# Patient Record
Sex: FEMALE | Race: WHITE | NOT HISPANIC OR LATINO | URBAN - METROPOLITAN AREA
[De-identification: names, ages, dates, MRNs, and addresses within clinical notes are randomized per-mention and may not be internally consistent; named-entity substitution may affect disease eponyms.]

---

## 2021-07-29 ENCOUNTER — INPATIENT (INPATIENT)
Facility: HOSPITAL | Age: 76
LOS: 3 days | Discharge: HOME | DRG: 85 | End: 2021-08-02
Attending: NEUROLOGICAL SURGERY | Admitting: NEUROLOGICAL SURGERY
Payer: MEDICARE

## 2021-07-29 VITALS
RESPIRATION RATE: 20 BRPM | OXYGEN SATURATION: 98 % | SYSTOLIC BLOOD PRESSURE: 142 MMHG | HEART RATE: 77 BPM | DIASTOLIC BLOOD PRESSURE: 72 MMHG | WEIGHT: 134.92 LBS | TEMPERATURE: 98 F | HEIGHT: 63 IN

## 2021-07-29 LAB
ALBUMIN SERPL ELPH-MCNC: 4.2 G/DL — SIGNIFICANT CHANGE UP (ref 3.3–5)
ALP SERPL-CCNC: 99 U/L — SIGNIFICANT CHANGE UP (ref 40–120)
ALT FLD-CCNC: 16 U/L — SIGNIFICANT CHANGE UP (ref 10–45)
ANION GAP SERPL CALC-SCNC: 14 MMOL/L — SIGNIFICANT CHANGE UP (ref 5–17)
AST SERPL-CCNC: 43 U/L — HIGH (ref 10–40)
BASOPHILS # BLD AUTO: 0.03 K/UL — SIGNIFICANT CHANGE UP (ref 0–0.2)
BASOPHILS NFR BLD AUTO: 0.2 % — SIGNIFICANT CHANGE UP (ref 0–2)
BILIRUB SERPL-MCNC: 0.5 MG/DL — SIGNIFICANT CHANGE UP (ref 0.2–1.2)
BUN SERPL-MCNC: 16 MG/DL — SIGNIFICANT CHANGE UP (ref 7–23)
CALCIUM SERPL-MCNC: 9.4 MG/DL — SIGNIFICANT CHANGE UP (ref 8.4–10.5)
CHLORIDE SERPL-SCNC: 90 MMOL/L — LOW (ref 96–108)
CO2 SERPL-SCNC: 22 MMOL/L — SIGNIFICANT CHANGE UP (ref 22–31)
CREAT SERPL-MCNC: 0.64 MG/DL — SIGNIFICANT CHANGE UP (ref 0.5–1.3)
EOSINOPHIL # BLD AUTO: 0.01 K/UL — SIGNIFICANT CHANGE UP (ref 0–0.5)
EOSINOPHIL NFR BLD AUTO: 0.1 % — SIGNIFICANT CHANGE UP (ref 0–6)
GLUCOSE SERPL-MCNC: 184 MG/DL — HIGH (ref 70–99)
HCT VFR BLD CALC: 38.1 % — SIGNIFICANT CHANGE UP (ref 34.5–45)
HGB BLD-MCNC: 12.6 G/DL — SIGNIFICANT CHANGE UP (ref 11.5–15.5)
IMM GRANULOCYTES NFR BLD AUTO: 0.6 % — SIGNIFICANT CHANGE UP (ref 0–1.5)
LIDOCAIN IGE QN: 31 U/L — SIGNIFICANT CHANGE UP (ref 7–60)
LYMPHOCYTES # BLD AUTO: 0.96 K/UL — LOW (ref 1–3.3)
LYMPHOCYTES # BLD AUTO: 6.2 % — LOW (ref 13–44)
MCHC RBC-ENTMCNC: 29 PG — SIGNIFICANT CHANGE UP (ref 27–34)
MCHC RBC-ENTMCNC: 33.1 GM/DL — SIGNIFICANT CHANGE UP (ref 32–36)
MCV RBC AUTO: 87.8 FL — SIGNIFICANT CHANGE UP (ref 80–100)
MONOCYTES # BLD AUTO: 0.85 K/UL — SIGNIFICANT CHANGE UP (ref 0–0.9)
MONOCYTES NFR BLD AUTO: 5.5 % — SIGNIFICANT CHANGE UP (ref 2–14)
NEUTROPHILS # BLD AUTO: 13.6 K/UL — HIGH (ref 1.8–7.4)
NEUTROPHILS NFR BLD AUTO: 87.4 % — HIGH (ref 43–77)
NRBC # BLD: 0 /100 WBCS — SIGNIFICANT CHANGE UP (ref 0–0)
PLATELET # BLD AUTO: 252 K/UL — SIGNIFICANT CHANGE UP (ref 150–400)
POTASSIUM SERPL-MCNC: 4.1 MMOL/L — SIGNIFICANT CHANGE UP (ref 3.5–5.3)
POTASSIUM SERPL-SCNC: 4.1 MMOL/L — SIGNIFICANT CHANGE UP (ref 3.5–5.3)
PROT SERPL-MCNC: 6.9 G/DL — SIGNIFICANT CHANGE UP (ref 6–8.3)
RBC # BLD: 4.34 M/UL — SIGNIFICANT CHANGE UP (ref 3.8–5.2)
RBC # FLD: 12.3 % — SIGNIFICANT CHANGE UP (ref 10.3–14.5)
SODIUM SERPL-SCNC: 126 MMOL/L — LOW (ref 135–145)
TROPONIN T, HIGH SENSITIVITY RESULT: 10 NG/L — SIGNIFICANT CHANGE UP (ref 0–51)
WBC # BLD: 15.54 K/UL — HIGH (ref 3.8–10.5)
WBC # FLD AUTO: 15.54 K/UL — HIGH (ref 3.8–10.5)

## 2021-07-29 PROCEDURE — 71045 X-RAY EXAM CHEST 1 VIEW: CPT | Mod: 26

## 2021-07-29 PROCEDURE — 93010 ELECTROCARDIOGRAM REPORT: CPT

## 2021-07-29 PROCEDURE — 72125 CT NECK SPINE W/O DYE: CPT | Mod: 26,MH

## 2021-07-29 PROCEDURE — 99291 CRITICAL CARE FIRST HOUR: CPT

## 2021-07-29 PROCEDURE — 70450 CT HEAD/BRAIN W/O DYE: CPT | Mod: 26,MH

## 2021-07-29 RX ORDER — ONDANSETRON 8 MG/1
4 TABLET, FILM COATED ORAL ONCE
Refills: 0 | Status: COMPLETED | OUTPATIENT
Start: 2021-07-29 | End: 2021-07-29

## 2021-07-29 RX ADMIN — ONDANSETRON 4 MILLIGRAM(S): 8 TABLET, FILM COATED ORAL at 23:15

## 2021-07-29 NOTE — ED PROVIDER NOTE - PHYSICAL EXAMINATION
GENERAL: non-toxic appearing, in NAD  HEAD: periorbital ecchymosis of L eye.  EYES: vision grossly intact, no conjunctivitis or discharge  EARS: hearing grossly intact  NOSE: no nasal discharge, dried blood in b/l nares, no septal hematoma appreciated  CARDIAC: RRR, normal S1S2,  no appreciable murmurs, no cyanosis, cap refill < 2 seconds  PULM: no respiratory distress, oxygen saturation on RA wnl, CTAB, no crackles, rales, rhonchi, or wheezing  GI: abdomen nondistended, soft, nontender, no guarding or rebound tenderness, no palpable masses  NEURO: Awake, alert, confused as to recent events. Sensation intact throughout, coordination intact. Moving all extremities freely.  MSK: spine appears normal, no joint swelling or erythema, no gross deformities of extremities  EXT: no peripheral edema, calf tenderness, redness or swelling  SKIN: warm, dry, and intact, no rashes  PSYCH: appropriate mood and affect

## 2021-07-29 NOTE — ED PROVIDER NOTE - SECONDARY DIAGNOSIS.
Closed fracture of right side of occipital bone, unspecified occipital fracture type, initial encounter

## 2021-07-29 NOTE — ED PROVIDER NOTE - ATTENDING CONTRIBUTION TO CARE
76 F w/ HTN, HLD, multiple falls bruising on the L eyelid and L knee bruising. emesis at home   Plan for labs ct scan and reassessment 76 F w/ HTN, HLD, multiple falls in the past 24 hours w/ last fall at approx 6 PM when  arrived home from work and he found his wife on the ground, he helped her up and she was able to ambulate to the bathroom when he fell again pt then had multiple falls and reports that she had dark emesis,  thinks she ate a plum today. Pt then went to sleep in bed but this evening  became concerned that the bruising on the L eyelid and L knee bruising was worsening and his wife was more confused. He reports his wife is usually active and can walk 5 miles a day. He states that she isn't acting normal. Pt w/ headache, +amnesia, nausea  on exam, pt w/ 5/5 upper and lower extremity strength, oriented to person and place thinks it is 1922, she is able to identify her  she cannot describe what happened today, she is asking to go home, she has no faical droop, she has clear lungs, no c/t/l spine tenderness, no pain when palpating the extremities, has mild L knee bruising but no tenderness, has a pressure wound on the elbow on the R, CT expedited Plan for labs ct scan and reassessment  pt found to have a head bleed, reviewed by ER staff- nsg consulted  updated on plan of care, and admission to hospital. pt not on ASA, no AC/AP

## 2021-07-29 NOTE — ED PROVIDER NOTE - CARE PLAN
Principal Discharge DX:	Intracranial hemorrhage  Secondary Diagnosis:	Closed fracture of right side of occipital bone, unspecified occipital fracture type, initial encounter

## 2021-07-30 DIAGNOSIS — I62.9 NONTRAUMATIC INTRACRANIAL HEMORRHAGE, UNSPECIFIED: ICD-10-CM

## 2021-07-30 DIAGNOSIS — E87.1 HYPO-OSMOLALITY AND HYPONATREMIA: ICD-10-CM

## 2021-07-30 DIAGNOSIS — S02.119A UNSPECIFIED FRACTURE OF OCCIPUT, INITIAL ENCOUNTER FOR CLOSED FRACTURE: ICD-10-CM

## 2021-07-30 DIAGNOSIS — R55 SYNCOPE AND COLLAPSE: ICD-10-CM

## 2021-07-30 LAB
ANION GAP SERPL CALC-SCNC: 12 MMOL/L — SIGNIFICANT CHANGE UP (ref 5–17)
ANION GAP SERPL CALC-SCNC: 12 MMOL/L — SIGNIFICANT CHANGE UP (ref 5–17)
ANION GAP SERPL CALC-SCNC: 14 MMOL/L — SIGNIFICANT CHANGE UP (ref 5–17)
APPEARANCE UR: ABNORMAL
APTT BLD: 27.2 SEC — LOW (ref 27.5–35.5)
BACTERIA # UR AUTO: ABNORMAL
BASE EXCESS BLDV CALC-SCNC: 3.9 MMOL/L — HIGH (ref -2–2)
BILIRUB UR-MCNC: NEGATIVE — SIGNIFICANT CHANGE UP
BUN SERPL-MCNC: 12 MG/DL — SIGNIFICANT CHANGE UP (ref 7–23)
BUN SERPL-MCNC: 12 MG/DL — SIGNIFICANT CHANGE UP (ref 7–23)
BUN SERPL-MCNC: 14 MG/DL — SIGNIFICANT CHANGE UP (ref 7–23)
CA-I SERPL-SCNC: 1.05 MMOL/L — LOW (ref 1.12–1.3)
CALCIUM SERPL-MCNC: 8.9 MG/DL — SIGNIFICANT CHANGE UP (ref 8.4–10.5)
CALCIUM SERPL-MCNC: 9 MG/DL — SIGNIFICANT CHANGE UP (ref 8.4–10.5)
CALCIUM SERPL-MCNC: 9.2 MG/DL — SIGNIFICANT CHANGE UP (ref 8.4–10.5)
CHLORIDE BLDV-SCNC: 93 MMOL/L — LOW (ref 96–108)
CHLORIDE SERPL-SCNC: 90 MMOL/L — LOW (ref 96–108)
CHLORIDE SERPL-SCNC: 93 MMOL/L — LOW (ref 96–108)
CHLORIDE SERPL-SCNC: 94 MMOL/L — LOW (ref 96–108)
CHLORIDE UR-SCNC: 74 MMOL/L — SIGNIFICANT CHANGE UP
CO2 BLDV-SCNC: 30 MMOL/L — SIGNIFICANT CHANGE UP (ref 22–30)
CO2 SERPL-SCNC: 22 MMOL/L — SIGNIFICANT CHANGE UP (ref 22–31)
CO2 SERPL-SCNC: 24 MMOL/L — SIGNIFICANT CHANGE UP (ref 22–31)
CO2 SERPL-SCNC: 26 MMOL/L — SIGNIFICANT CHANGE UP (ref 22–31)
COLOR SPEC: SIGNIFICANT CHANGE UP
CREAT ?TM UR-MCNC: 51 MG/DL — SIGNIFICANT CHANGE UP
CREAT ?TM UR-MCNC: 81 MG/DL — SIGNIFICANT CHANGE UP
CREAT SERPL-MCNC: 0.62 MG/DL — SIGNIFICANT CHANGE UP (ref 0.5–1.3)
CREAT SERPL-MCNC: 0.68 MG/DL — SIGNIFICANT CHANGE UP (ref 0.5–1.3)
CREAT SERPL-MCNC: 0.68 MG/DL — SIGNIFICANT CHANGE UP (ref 0.5–1.3)
DIFF PNL FLD: NEGATIVE — SIGNIFICANT CHANGE UP
GAS PNL BLDV: 126 MMOL/L — LOW (ref 135–145)
GAS PNL BLDV: SIGNIFICANT CHANGE UP
GAS PNL BLDV: SIGNIFICANT CHANGE UP
GLUCOSE BLDV-MCNC: 163 MG/DL — HIGH (ref 70–99)
GLUCOSE SERPL-MCNC: 120 MG/DL — HIGH (ref 70–99)
GLUCOSE SERPL-MCNC: 122 MG/DL — HIGH (ref 70–99)
GLUCOSE SERPL-MCNC: 142 MG/DL — HIGH (ref 70–99)
GLUCOSE UR QL: NEGATIVE — SIGNIFICANT CHANGE UP
HCO3 BLDV-SCNC: 28 MMOL/L — SIGNIFICANT CHANGE UP (ref 21–29)
HCT VFR BLDA CALC: 39 % — SIGNIFICANT CHANGE UP (ref 39–50)
HGB BLD CALC-MCNC: 12.6 G/DL — SIGNIFICANT CHANGE UP (ref 11.5–15.5)
INR BLD: 0.96 RATIO — SIGNIFICANT CHANGE UP (ref 0.88–1.16)
KETONES UR-MCNC: NEGATIVE — SIGNIFICANT CHANGE UP
LACTATE BLDV-MCNC: 2 MMOL/L — SIGNIFICANT CHANGE UP (ref 0.7–2)
LEUKOCYTE ESTERASE UR-ACNC: ABNORMAL
NITRITE UR-MCNC: NEGATIVE — SIGNIFICANT CHANGE UP
OSMOLALITY SERPL: 264 MOSMOL/KG — LOW (ref 280–301)
OSMOLALITY UR: 518 MOS/KG — SIGNIFICANT CHANGE UP (ref 300–900)
OSMOLALITY UR: 544 MOS/KG — SIGNIFICANT CHANGE UP (ref 300–900)
PCO2 BLDV: 43 MMHG — SIGNIFICANT CHANGE UP (ref 35–50)
PH BLDV: 7.43 — SIGNIFICANT CHANGE UP (ref 7.35–7.45)
PH UR: 6.5 — SIGNIFICANT CHANGE UP (ref 5–8)
PO2 BLDV: 35 MMHG — SIGNIFICANT CHANGE UP (ref 25–45)
POTASSIUM BLDV-SCNC: 2.8 MMOL/L — CRITICAL LOW (ref 3.5–5.3)
POTASSIUM SERPL-MCNC: 3.3 MMOL/L — LOW (ref 3.5–5.3)
POTASSIUM SERPL-MCNC: 3.4 MMOL/L — LOW (ref 3.5–5.3)
POTASSIUM SERPL-MCNC: 3.5 MMOL/L — SIGNIFICANT CHANGE UP (ref 3.5–5.3)
POTASSIUM SERPL-SCNC: 3.3 MMOL/L — LOW (ref 3.5–5.3)
POTASSIUM SERPL-SCNC: 3.4 MMOL/L — LOW (ref 3.5–5.3)
POTASSIUM SERPL-SCNC: 3.5 MMOL/L — SIGNIFICANT CHANGE UP (ref 3.5–5.3)
POTASSIUM UR-SCNC: 70 MMOL/L — SIGNIFICANT CHANGE UP
PROT UR-MCNC: NEGATIVE — SIGNIFICANT CHANGE UP
PROTHROM AB SERPL-ACNC: 11.5 SEC — SIGNIFICANT CHANGE UP (ref 10.6–13.6)
RBC CASTS # UR COMP ASSIST: 5 /HPF — HIGH (ref 0–4)
SAO2 % BLDV: 68 % — SIGNIFICANT CHANGE UP (ref 67–88)
SARS-COV-2 RNA SPEC QL NAA+PROBE: SIGNIFICANT CHANGE UP
SODIUM SERPL-SCNC: 126 MMOL/L — LOW (ref 135–145)
SODIUM SERPL-SCNC: 129 MMOL/L — LOW (ref 135–145)
SODIUM SERPL-SCNC: 132 MMOL/L — LOW (ref 135–145)
SODIUM UR-SCNC: 69 MMOL/L — SIGNIFICANT CHANGE UP
SP GR SPEC: 1.02 — SIGNIFICANT CHANGE UP (ref 1.01–1.02)
TROPONIN T, HIGH SENSITIVITY RESULT: 13 NG/L — SIGNIFICANT CHANGE UP (ref 0–51)
UROBILINOGEN FLD QL: NEGATIVE — SIGNIFICANT CHANGE UP

## 2021-07-30 PROCEDURE — 93010 ELECTROCARDIOGRAM REPORT: CPT

## 2021-07-30 PROCEDURE — 70450 CT HEAD/BRAIN W/O DYE: CPT | Mod: 26,MH

## 2021-07-30 PROCEDURE — 71250 CT THORAX DX C-: CPT | Mod: 26

## 2021-07-30 PROCEDURE — 73560 X-RAY EXAM OF KNEE 1 OR 2: CPT | Mod: 26,LT

## 2021-07-30 PROCEDURE — 93306 TTE W/DOPPLER COMPLETE: CPT | Mod: 26

## 2021-07-30 PROCEDURE — 70496 CT ANGIOGRAPHY HEAD: CPT | Mod: 26

## 2021-07-30 PROCEDURE — 99233 SBSQ HOSP IP/OBS HIGH 50: CPT

## 2021-07-30 PROCEDURE — 74176 CT ABD & PELVIS W/O CONTRAST: CPT | Mod: 26

## 2021-07-30 PROCEDURE — 76376 3D RENDER W/INTRP POSTPROCES: CPT | Mod: 26

## 2021-07-30 PROCEDURE — 99222 1ST HOSP IP/OBS MODERATE 55: CPT | Mod: GC

## 2021-07-30 RX ORDER — FAMOTIDINE 10 MG/ML
20 INJECTION INTRAVENOUS
Refills: 0 | Status: DISCONTINUED | OUTPATIENT
Start: 2021-07-30 | End: 2021-08-02

## 2021-07-30 RX ORDER — MECLIZINE HCL 12.5 MG
1 TABLET ORAL
Qty: 0 | Refills: 0 | DISCHARGE

## 2021-07-30 RX ORDER — ONDANSETRON 8 MG/1
4 TABLET, FILM COATED ORAL EVERY 6 HOURS
Refills: 0 | Status: DISCONTINUED | OUTPATIENT
Start: 2021-07-30 | End: 2021-08-02

## 2021-07-30 RX ORDER — MONTELUKAST 4 MG/1
1 TABLET, CHEWABLE ORAL
Qty: 0 | Refills: 0 | DISCHARGE

## 2021-07-30 RX ORDER — PANTOPRAZOLE SODIUM 20 MG/1
80 TABLET, DELAYED RELEASE ORAL ONCE
Refills: 0 | Status: COMPLETED | OUTPATIENT
Start: 2021-07-30 | End: 2021-07-30

## 2021-07-30 RX ORDER — INSULIN LISPRO 100/ML
VIAL (ML) SUBCUTANEOUS
Refills: 0 | Status: DISCONTINUED | OUTPATIENT
Start: 2021-07-30 | End: 2021-08-02

## 2021-07-30 RX ORDER — ATORVASTATIN CALCIUM 80 MG/1
40 TABLET, FILM COATED ORAL ONCE
Refills: 0 | Status: DISCONTINUED | OUTPATIENT
Start: 2021-07-30 | End: 2021-07-30

## 2021-07-30 RX ORDER — LEVETIRACETAM 250 MG/1
500 TABLET, FILM COATED ORAL
Refills: 0 | Status: DISCONTINUED | OUTPATIENT
Start: 2021-07-30 | End: 2021-08-02

## 2021-07-30 RX ORDER — FLUTICASONE PROPIONATE 50 MCG
1 SPRAY, SUSPENSION NASAL
Qty: 0 | Refills: 0 | DISCHARGE

## 2021-07-30 RX ORDER — SODIUM CHLORIDE 5 G/100ML
1000 INJECTION, SOLUTION INTRAVENOUS
Refills: 0 | Status: DISCONTINUED | OUTPATIENT
Start: 2021-07-30 | End: 2021-07-30

## 2021-07-30 RX ORDER — ALBUTEROL 90 UG/1
2 AEROSOL, METERED ORAL
Qty: 0 | Refills: 0 | DISCHARGE

## 2021-07-30 RX ORDER — FLUTICASONE PROPIONATE 50 MCG
1 SPRAY, SUSPENSION NASAL
Refills: 0 | Status: DISCONTINUED | OUTPATIENT
Start: 2021-07-30 | End: 2021-08-02

## 2021-07-30 RX ORDER — ALPRAZOLAM 0.25 MG
0.25 TABLET ORAL THREE TIMES A DAY
Refills: 0 | Status: DISCONTINUED | OUTPATIENT
Start: 2021-07-30 | End: 2021-08-02

## 2021-07-30 RX ORDER — PANTOPRAZOLE SODIUM 20 MG/1
40 TABLET, DELAYED RELEASE ORAL
Refills: 0 | Status: DISCONTINUED | OUTPATIENT
Start: 2021-07-30 | End: 2021-08-02

## 2021-07-30 RX ORDER — ATENOLOL 25 MG/1
25 TABLET ORAL DAILY
Refills: 0 | Status: DISCONTINUED | OUTPATIENT
Start: 2021-07-30 | End: 2021-08-02

## 2021-07-30 RX ORDER — SENNA PLUS 8.6 MG/1
2 TABLET ORAL AT BEDTIME
Refills: 0 | Status: DISCONTINUED | OUTPATIENT
Start: 2021-07-30 | End: 2021-07-31

## 2021-07-30 RX ORDER — ATENOLOL 25 MG/1
1 TABLET ORAL
Qty: 0 | Refills: 0 | DISCHARGE

## 2021-07-30 RX ORDER — ATORVASTATIN CALCIUM 80 MG/1
40 TABLET, FILM COATED ORAL ONCE
Refills: 0 | Status: DISCONTINUED | OUTPATIENT
Start: 2021-07-30 | End: 2021-08-02

## 2021-07-30 RX ORDER — FAMOTIDINE 10 MG/ML
1 INJECTION INTRAVENOUS
Qty: 0 | Refills: 0 | DISCHARGE

## 2021-07-30 RX ORDER — INSULIN LISPRO 100/ML
VIAL (ML) SUBCUTANEOUS AT BEDTIME
Refills: 0 | Status: DISCONTINUED | OUTPATIENT
Start: 2021-07-30 | End: 2021-08-02

## 2021-07-30 RX ORDER — ALPRAZOLAM 0.25 MG
1 TABLET ORAL
Qty: 0 | Refills: 0 | DISCHARGE

## 2021-07-30 RX ORDER — ATORVASTATIN CALCIUM 80 MG/1
1 TABLET, FILM COATED ORAL
Qty: 0 | Refills: 0 | DISCHARGE

## 2021-07-30 RX ORDER — POTASSIUM CHLORIDE 20 MEQ
20 PACKET (EA) ORAL
Refills: 0 | Status: COMPLETED | OUTPATIENT
Start: 2021-07-30 | End: 2021-07-31

## 2021-07-30 RX ORDER — ALBUTEROL 90 UG/1
1 AEROSOL, METERED ORAL EVERY 6 HOURS
Refills: 0 | Status: DISCONTINUED | OUTPATIENT
Start: 2021-07-30 | End: 2021-08-02

## 2021-07-30 RX ORDER — MONTELUKAST 4 MG/1
10 TABLET, CHEWABLE ORAL DAILY
Refills: 0 | Status: DISCONTINUED | OUTPATIENT
Start: 2021-07-30 | End: 2021-08-02

## 2021-07-30 RX ORDER — ACETAMINOPHEN 500 MG
650 TABLET ORAL EVERY 6 HOURS
Refills: 0 | Status: DISCONTINUED | OUTPATIENT
Start: 2021-07-30 | End: 2021-08-02

## 2021-07-30 RX ORDER — ACETAMINOPHEN 500 MG
1000 TABLET ORAL ONCE
Refills: 0 | Status: COMPLETED | OUTPATIENT
Start: 2021-07-30 | End: 2021-07-30

## 2021-07-30 RX ORDER — FLUTICASONE PROPIONATE 50 MCG
1 SPRAY, SUSPENSION NASAL EVERY 24 HOURS
Refills: 0 | Status: DISCONTINUED | OUTPATIENT
Start: 2021-07-30 | End: 2021-07-30

## 2021-07-30 RX ORDER — SODIUM CHLORIDE 5 G/100ML
1000 INJECTION, SOLUTION INTRAVENOUS
Refills: 0 | Status: DISCONTINUED | OUTPATIENT
Start: 2021-07-30 | End: 2021-07-31

## 2021-07-30 RX ORDER — MECLIZINE HCL 12.5 MG
12.5 TABLET ORAL ONCE
Refills: 0 | Status: DISCONTINUED | OUTPATIENT
Start: 2021-07-30 | End: 2021-08-02

## 2021-07-30 RX ORDER — POTASSIUM CHLORIDE 20 MEQ
40 PACKET (EA) ORAL EVERY 4 HOURS
Refills: 0 | Status: COMPLETED | OUTPATIENT
Start: 2021-07-30 | End: 2021-07-30

## 2021-07-30 RX ORDER — DEXLANSOPRAZOLE 30 MG/1
1 CAPSULE, DELAYED RELEASE ORAL
Qty: 0 | Refills: 0 | DISCHARGE

## 2021-07-30 RX ADMIN — Medication 650 MILLIGRAM(S): at 09:30

## 2021-07-30 RX ADMIN — SODIUM CHLORIDE 30 MILLILITER(S): 5 INJECTION, SOLUTION INTRAVENOUS at 02:37

## 2021-07-30 RX ADMIN — PANTOPRAZOLE SODIUM 40 MILLIGRAM(S): 20 TABLET, DELAYED RELEASE ORAL at 08:42

## 2021-07-30 RX ADMIN — LEVETIRACETAM 500 MILLIGRAM(S): 250 TABLET, FILM COATED ORAL at 01:15

## 2021-07-30 RX ADMIN — Medication 400 MILLIGRAM(S): at 02:06

## 2021-07-30 RX ADMIN — Medication 50 MILLIEQUIVALENT(S): at 22:20

## 2021-07-30 RX ADMIN — PANTOPRAZOLE SODIUM 80 MILLIGRAM(S): 20 TABLET, DELAYED RELEASE ORAL at 02:06

## 2021-07-30 RX ADMIN — Medication 650 MILLIGRAM(S): at 17:00

## 2021-07-30 RX ADMIN — Medication 650 MILLIGRAM(S): at 08:48

## 2021-07-30 RX ADMIN — ATENOLOL 25 MILLIGRAM(S): 25 TABLET ORAL at 08:42

## 2021-07-30 RX ADMIN — Medication 40 MILLIEQUIVALENT(S): at 19:01

## 2021-07-30 RX ADMIN — Medication 650 MILLIGRAM(S): at 16:04

## 2021-07-30 RX ADMIN — LEVETIRACETAM 500 MILLIGRAM(S): 250 TABLET, FILM COATED ORAL at 19:00

## 2021-07-30 NOTE — ED ADULT NURSE NOTE - NSFALLRSKOUTCOME_ED_ALL_ED
Head,  normocephalic,  atraumatic,  Face,  Face within normal limits,  Ears,  External ears within normal limits,  Nose/Nasopharynx,  External nose  normal appearance,  nares patent,  no nasal discharge,  Mouth and Throat,  Oral cavity appearance normal,  Breath odor normal,  Lips,  Appearance normal
Fall with Harm Risk

## 2021-07-30 NOTE — ED ADULT NURSE NOTE - OBJECTIVE STATEMENT
77y/o F coming to the ED s/p fall.  states that around 6pm he came home from work and found the pt on the floor, was able to get her up and she fell a few more times a/w multiple episodes of vomiting.  states that she normally walks 5miles/day and is A&Ox4. On exam PT A&Ox2, unable to state where & what happened & does not remember fall, neg facial drop, neg slurred speech, PERRL 3mm with hematoma to L eye, denies blurred vision, equal strength and sensations in all extremities. Denies numbness, tingling, or weakness & able to follow commands. Heart monitor placed, NSR. Abdomen soft, nontender, nondistended. Abrasions noted to B/L elbows & knee. IV placed, Labs collected and sent. Pt given call bell and educated on how to use. 77y/o F coming to the ED s/p fall.  states that around 6pm he came home from work and found the pt on the floor, was able to get her up and she fell a few more times a/w multiple episodes of vomiting.  states that she normally walks 5miles/day and is A&Ox4. On exam PT A&Ox2, unable to state where & what happened & does not remember fall, neg facial drop, neg slurred speech, PERRL 3mm with hematoma to L eye, denies blurred vision, equal strength and sensations in all extremities. Denies numbness, tingling, or weakness & able to follow commands. Heart monitor placed, NSR. Abdomen soft, nontender, nondistended. Abrasions noted to B/L elbows & knee. Pt states feels safe @ home & denies any abuse. IV placed, Labs collected and sent. Pt given call bell and educated on how to use.

## 2021-07-30 NOTE — CONSULT NOTE ADULT - SUBJECTIVE AND OBJECTIVE BOX
Past Medical History:     Reviewed history from 12/09/2019 and no changes required:        Hypertension        Hyperlipidemia        [-] Diabetes        Chronic hiatus hernia, questionably since childhood.        Chronic insufficiency        Mild asthma        Knee OA, mild        Increased ferritin, negative hematology workup.        8/19 holter sinus rhythm        9/19 Stress echo borderline EKG changes, nrml echo, nrml stress        anticipated left eye cataract  12/17/19 dr Cleary    Past Surgical History:     Reviewed history from 08/19/2019 and no changes required:        Lipoma resection rit arm 2017.        Previous D&C.        Strabismus eye surgery, age 1                    Family History Summary:      Reviewed history Last on 02/03/2021 and no changes required:05/19/2021      General Comments - FH:  Mother -- Dec'd 81, dementia.  Father -- Dec'd 64, COPD, MI.  Sister -- 65, good health.  Son -- 51, colon cancer, doing well.  Daughter -- 48, good health.    Social History:     Reviewed history from 08/19/2019 and no changes required:        .        Nonsmoker.        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       Past Medical History:     Reviewed history from 12/09/2019 and no changes required:        Hypertension        Hyperlipidemia        [-] Diabetes        Chronic hiatus hernia, questionably since childhood.        Chronic insufficiency        Mild asthma        Knee OA, mild        Increased ferritin, negative hematology workup.        8/19 holter sinus rhythm        9/19 Stress echo borderline EKG changes, nrml echo, nrml stress        anticipated left eye cataract  12/17/19 dr Cleary    Past Surgical History:     Reviewed history from 08/19/2019 and no changes required:        Lipoma resection [facility] arm 2017.        Previous D&C.        Strabismus eye surgery, age 1          Family History Summary:      Reviewed history Last on 02/03/2021 and no changes required:05/19/2021      General Comments - FH:  Mother -- Dec'd 81, dementia.  Father -- Dec'd 64, COPD, MI.  Sister -- 65, good health.  Son -- 51, colon cancer, doing well.  Daughter -- 48, good health.    Social History:     Reviewed history from 08/19/2019 and no changes required:        .        Nonsmoker.        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            Transthoracic Echocardiography Report (TTE)     Patient Name LESLYE ARGUELLO  Accession #          [id number]                B      MRN #        [id number]            Date of Study        05/14/2021      Date of      1945        Referring Physician  Goldberg,Steven MD   Birth      Age          75 year(s)        Sonographer          Marcin Corral, RVS      Gender       Female            Interpreting         Jimmy Moya MD                                  Physician     Height: 63 inches Weight: 128 pounds BSA: 1.6 m^2 BMI: 22.67 kg/m^2    M-Mode/2D Measurements DERIVED VARIABLES      LVID Diastole: 4.9 cm                                    FS34.69 %   LVID Systole: 3.2 cm                                     EF [id number] %   LV Septum: 0.8 cm   LV PW: 0.8 cm   LV EDV/LV EDV Index: 69.9 ml/ 44 ml/m^2   LV ESV/LV ESV Index: 23.1 ml/ 14 ml/m^2      Aortic Root Dimension: 2.8 cm   Ascending Aorta:3.3 cm      LA Dimension: 3.4 cm   LA volume/Index: 26.2 ml /16 m^2     Aortic Valve Mitral Valve      AV Peak Velocity: 1.21 m/s          MV Peak E-Wave: 0.71 m/s   AV Peak Gradient: 5.86 mmHg         MV Peak A-Wave: 1.07 m/s   AV Mean Gradient: 3 mmHg            MV E/A Ratio: 0.67   LVOT Peak Velocity: 1.05 m/s        MV P1/2t: 61 msec   LVOT: 2.2 cm                        MV Mean Gradient: 2 mmHg   AV Area (Continuity):3.22 cm^2      MV Area (continuity): 2.11 cm^2                                       MV Deceleration Time: 208 msec                                       MV Area (PHT): 3.61 cm^2   Tricuspid Valve                     E' Septal Velocity: 0.05 m/s   TR Velocity:2.02 m/s                E' Lateral Velocity: 0.07 m/s   TR Gradient:16.32 mmHg                                          Pulmonic Valve                                       PV Peak Velocity: 0.88 m/s                                       PV Peak Gradient: 3.06 mmHg                                       PV Mean Gradient:2 mmHg      Findings      Mitral Valve   Normal mitral valve structure and function.   No evidence of mitral valve stenosis.   No evidence of mitral regurgitation.      Aortic Valve   Calcified, thickened, tricuspid valve.   Mild aortic regurgitation.   No aortic stenosis.      Tricuspid Valve   Normal tricuspid valve structure and function.   Normal pulmonary artery pressure. Estimated PASP 24 mmHg.      Pulmonic Valve   The pulmonic valve was not well visualized .      Left Atrium   Normal left atrium.      Left Ventricle   Normal left ventricle size, thickness and systolic function.   LVEF 60-65 %   E/A flow reversal noted. Suggestive of diastolic dysfunction.      Right Atrium   Normal right atrium.      Right Ventricle   Normal right ventricle structure and function.      Pericardial Effusion   No evidence of any pericardial effusion.      Miscellaneous   Dilation of ascending aorta 3.3 cm, indexed to BSA 2cm/m2.      Summary   Normal left ventricle size, thickness and systolic function.   LVEF 60-65 %   Reduced LV compliance.   Normal LA and right cavities size.   Aortic valve sclerosis without significant stenosis.   Mild aortic regurgitation.   Normal pulmonary artery pressure.   No evidence of pericardial effusion.   Dilation of ascending aorta 3.3 cm, indexed to BSA 2cm/m2.      Signature      ----------------------------------------------------------------   Electronically signed by Jimmy Moya MD (Interpreting   physician) on 05/14/2021 at 10:23 AM                                                                                                                                               Status:Open                                                        Carotid Duplex Study     Demographics                                                        Procedure Staff   Patient name: LESLYE WEAVER                                    Ordering physician:           [facility] CARD[facility]OLOG[facility]   Patient ID:   [id number]                                                                       Referring Physician:          Goldberg,Steven MD   Gender:            Female                                           Interpreting physician:       Jimmy Moya MD   YOB: 1945                                        Sonographer:                  Marcin Corral S      Procedure Information  Procedure date: 5/14/2021 7:18 AM                                   Accession no:        [id number]    Carotid Procedure Findings                                Right                                             Left   Location                   PSV (cm/s)   EDV (cm/s)        Stenosis %         PSV (cm/s)             EDV (cm/s)     Stenosis %   Prox CCA                   83.7         15.7              0%                 74.2                   20.8           0%   Mid CCA                    80.3         23.5              0%                 71.3                   21.4           0%   Dist CCA                   68.1         18.3              0%                 82.6                   23.8           0%   Bulb                       60.1         15.5              0%                 58.2                   11.3           0%   Prox ICA                   77.1         19.9              0%                 73.6                   23.2           0%   Mid ICA                    89.2         25.7              0%                 107                    39.2           16-49%   Dist ICA                   51.9         18.4              0%                 48                     14.5           0%   Prox ECA                   56.7         11.5              0%                 77.2                   20.2           0%   Vertebral                  74           18.9              0%                 58.8                   13.3           0%   Mid Subclavian             81.3                           0%                 85.3                                  0%  Right ICA/CCA ratio: 1.31                                                         Left ICA/CCA ratio: 1.3  Right vertebral flow:                                               Left vertebral flow:  Antegrade                                                           Antegrade      Findings:  Color Doppler imaging of the bilateral common carotid, bulb, internal and external carotids showed mild to moderate  heterogenous plaque in left internal carotid artery. No evidence of hemodynamically significant stenosis bilaterally. Vertebrals  were antegrade bilaterally.          Patient name: LESLYE WEAVER                                                      5/14/2021 7:18 AM                                                                      Date of                                                                      study:  Encounter#: [id number]                                                                                                                                 Page 1/2  Accession#: [id number]                                                [facility]                                                     [address] [place] [address]                                                                    Tel: [phone]                                                                    Fax: [phone]      Physician Conclusions  Summary:  1. MILD TO MODERATE LEFT INTERNAL CAROTID ARTERY STENOSIS 16-49%.    Electronically signed by Jimmy Moya MD (Interpreting physician) on 5/14/2021 at 10:27 AM       This is a pleasant 76-year-old female with a past medical history of hypertension, hyperlipidemia, mild intermittent asthma, osteoarthritis of the knees, who presented to the [facility] emergency room during the late evening hours of July 30th, 2021, after having experienced several episodes of syncope at home earlier in the day, which eventually resulted in a fall.  The patient, when questioned about the timing, duration, associated symptoms, and surrounding context of her syncopal episodes + fall, cannot precisely recall the exact details surrounding her syncopal episodes, nor does she remember the actual fall; she does recalls being assisted by her , who apparently found her on the ground when he came home, to the restroom where she had several episodes of vomiting "dark blood".              Past Medical History:     Reviewed history from 12/09/2019 and no changes required:        Hypertension        Hyperlipidemia        [-] Diabetes        Chronic hiatus hernia, questionably since childhood.        Chronic venous insufficiency        Mild intermittent asthma        Knee OA, mild        Increased ferritin, negative hematology workup.        8/19 holter sinus rhythm        9/19 Stress echo borderline EKG changes, nrml echo, nrml stress        anticipated left eye cataract  12/17/19 dr Cleary    Past Surgical History:     Reviewed history from 08/19/2019 and no changes required:        Lipoma resection rit arm 2017.        Previous D&C.        Strabismus eye surgery, age 1          Family History Summary:      Reviewed history Last on 02/03/2021 and no changes required:05/19/2021      General Comments - FH:  Mother -- Dec'd 81, dementia.  Father -- Dec'd 64, COPD, MI.  Sister -- 65, good health.  Son -- 51, colon cancer, doing well.  Daughter -- 48, good health.    Social History:     Reviewed history from 08/19/2019 and no changes required:        .        Nonsmoker.        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            Transthoracic Echocardiography Report (TTE)     Patient Name LESLYE ARGUELLO  Accession #          [id number]                B      MRN #        [id number]            Date of Study        05/14/2021      Date of      1945        Referring Physician  Goldberg,Steven MD   Birth      Age          75 year(s)        Sonographer          Marcin Corral RCS, RVS      Gender       Female            Interpreting         Jimmy Moya MD                                  Physician     Height: 63 inches Weight: 128 pounds BSA: 1.6 m^2 BMI: 22.67 kg/m^2    M-Mode/2D Measurements DERIVED VARIABLES      LVID Diastole: 4.9 cm                                    FS34.69 %   LVID Systole: 3.2 cm                                     EF Khytqm58 %   LV Septum: 0.8 cm   LV PW: 0.8 cm   LV EDV/LV EDV Index: 69.9 ml/ 44 ml/m^2   LV ESV/LV ESV Index: 23.1 ml/ 14 ml/m^2      Aortic Root Dimension: 2.8 cm   Ascending Aorta:3.3 cm      LA Dimension: 3.4 cm   LA volume/Index: 26.2 ml /16 m^2     Aortic Valve Mitral Valve      AV Peak Velocity: 1.21 m/s          MV Peak E-Wave: 0.71 m/s   AV Peak Gradient: 5.86 mmHg         MV Peak A-Wave: 1.07 m/s   AV Mean Gradient: 3 mmHg            MV E/A Ratio: 0.67   LVOT Peak Velocity: 1.05 m/s        MV P1/2t: 61 msec   LVOT: 2.2 cm                        MV Mean Gradient: 2 mmHg   AV Area (Continuity):3.22 cm^2      MV Area (continuity): 2.11 cm^2                                       MV Deceleration Time: 208 msec                                       MV Area (PHT): 3.61 cm^2   Tricuspid Valve                     E' Septal Velocity: 0.05 m/s   TR Velocity:2.02 m/s                E' Lateral Velocity: 0.07 m/s   TR Gradient:16.32 mmHg                                          Pulmonic Valve                                       PV Peak Velocity: 0.88 m/s                                       PV Peak Gradient: 3.06 mmHg                                       PV Mean Gradient:2 mmHg      Findings      Mitral Valve   Normal mitral valve structure and function.   No evidence of mitral valve stenosis.   No evidence of mitral regurgitation.      Aortic Valve   Calcified, thickened, tricuspid valve.   Mild aortic regurgitation.   No aortic stenosis.      Tricuspid Valve   Normal tricuspid valve structure and function.   Normal pulmonary artery pressure. Estimated PASP 24 mmHg.      Pulmonic Valve   The pulmonic valve was not well visualized .      Left Atrium   Normal left atrium.      Left Ventricle   Normal left ventricle size, thickness and systolic function.   LVEF 60-65 %   E/A flow reversal noted. Suggestive of diastolic dysfunction.      Right Atrium   Normal right atrium.      Right Ventricle   Normal right ventricle structure and function.      Pericardial Effusion   No evidence of any pericardial effusion.      Miscellaneous   Dilation of ascending aorta 3.3 cm, indexed to BSA 2cm/m2.      Summary   Normal left ventricle size, thickness and systolic function.   LVEF 60-65 %   Reduced LV compliance.   Normal LA and right cavities size.   Aortic valve sclerosis without significant stenosis.   Mild aortic regurgitation.   Normal pulmonary artery pressure.   No evidence of pericardial effusion.   Dilation of ascending aorta 3.3 cm, indexed to BSA 2cm/m2.      Signature      ----------------------------------------------------------------   Electronically signed by Jimmy Moya MD (Interpreting   physician) on 05/14/2021 at 10:23 AM                                                                                                                                               Status:Open                                                        Carotid Duplex Study     Demographics                                                        Procedure Staff   Patient name: LESLYE WEAVER                                    Ordering physician:           [facility] CARDIOLOGY   Patient ID:   [id number]                                                                       Referring Physician:          Goldberg,Steven MD   Gender:            Female                                           Interpreting physician:       Jimmy Moya MD   YOB: 1945                                        Sonographer:                  Marcin Corral, RVS      Procedure Information  Procedure date: 5/14/2021 7:18 AM                                   Accession no:        [id number]    Carotid Procedure Findings                                Right                                             Left   Location                   PSV (cm/s)   EDV (cm/s)        Stenosis %         PSV (cm/s)             EDV (cm/s)     Stenosis %   Prox CCA                   83.7         15.7              0%                 74.2                   20.8           0%   Mid CCA                    80.3         23.5              0%                 71.3                   21.4           0%   Dist CCA                   68.1         18.3              0%                 82.6                   23.8           0%   Bulb                       60.1         15.5              0%                 58.2                   11.3           0%   Prox ICA                   77.1         19.9              0%                 73.6                   23.2           0%   Mid ICA                    89.2         25.7              0%                 107                    39.2           16-49%   Dist ICA                   51.9         18.4              0%                 48                     14.5           0%   Prox ECA                   56.7         11.5              0%                 77.2                   20.2           0%   Vertebral                  74           18.9              0%                 58.8                   13.3           0%   Mid Subclavian             81.3                           0%                 85.3                                  0%  Right ICA/CCA ratio: 1.31                                                         Left ICA/CCA ratio: 1.3  Right vertebral flow:                                               Left vertebral flow:  Antegrade                                                           Antegrade      Findings:  Color Doppler imaging of the bilateral common carotid, bulb, internal and external carotids showed mild to moderate  heterogenous plaque in left internal carotid artery. No evidence of hemodynamically significant stenosis bilaterally. Vertebrals  were antegrade bilaterally.          Patient name: LESLYE WEAVER                                                      5/14/2021 7:18 AM                                                                      Date of                                                                      study:  Encounter#: [id number]                                                                                                                                 Page 1/2  Accession#: [id number]                                                [facility]                                                     [address] [place] [address]                                                                    Tel: [phone]                                                                    Fax: [phone]      Physician Conclusions  Summary:  1. MILD TO MODERATE LEFT INTERNAL CAROTID ARTERY STENOSIS 16-49%.    Electronically signed by Jimmy Moya MD (Interpreting physician) on 5/14/2021 at 10:27 AM       This is a pleasant 76-year-old female with a past medical history of hypertension, hyperlipidemia, mild intermittent asthma, osteoarthritis of the knees, who presented to the [facility] emergency room during the late evening hours of July 30th, 2021, after having experienced several episodes of syncope at home earlier in the day, which eventually resulted in a fall.  The patient, when questioned about the timing, duration, associated symptoms, and surrounding context of her syncopal episodes + fall, cannot precisely recall the exact details surrounding her syncopal episodes, nor does she remember the actual fall; she does recalls being assisted by her , who apparently found her on the ground when he came home, to the restroom where she had several episodes of vomiting "dark blood".  Of note, when pt saw Dr. Steven Goldberg on September 30, 2020, she reported to him that on September 28, 2020, she had been fasting for the Voodoo holiday. At 4PM she felt clammy, sweaty and nauseous. She had some water with pounding headache. She gradually felt better but it took 4hrs to get back to herself. Her back was achy as well. The next day she walked 4.8 miles instead of 5 miles because she felt tired. She reported no chest pain or palpitations. She did not syncopize or fall that day.  This episodes was attributed to vasovagal syncope and did not recur.              Past Medical History:     Reviewed history from 12/09/2019 and no changes required:        Hypertension        Hyperlipidemia        [-] Diabetes        Chronic hiatus hernia, questionably since childhood.        Chronic venous insufficiency        Mild intermittent asthma        Knee OA, mild        Increased ferritin, negative hematology workup.        8/19 holter sinus rhythm        9/19 Stress echo borderline EKG changes, nrml echo, nrml stress        anticipated left eye cataract  12/17/19 dr Cleary    Past Surgical History:     Reviewed history from 08/19/2019 and no changes required:        [facility].        Previous D&C.        Strabismus eye surgery, age 1          Family History Summary:      Reviewed history Last on 02/03/2021 and no changes required:05/19/2021      General Comments - FH:  Mother -- Dec'd 81, dementia.  Father -- Dec'd 64, COPD, MI.  Sister -- 65, good health.  Son -- 51, colon cancer, doing well.  Daughter -- 48, good health.    Social History:     Reviewed history from 08/19/2019 and no changes required:        .        Nonsmoker.        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            Transthoracic Echocardiography Report (TTE)     Patient Name LESLYE ARGUELLO  Accession #          [id number]                B      MRN #        [id number]            Date of Study        05/14/2021      Date of      1945        Referring Physician  Goldberg,Steven MD   Birth      Age          75 year(s)        Sonographer          Marcin Corral, RVS      Gender       Female            Interpreting         Jimmy Moya MD                                  Physician     Height: 63 inches Weight: 128 pounds BSA: 1.6 m^2 BMI: 22.67 kg/m^2    M-Mode/2D Measurements DERIVED VARIABLES      LVID Diastole: 4.9 cm                                    FS34.69 %   LVID Systole: 3.2 cm                                     EF [id number] %   LV Septum: 0.8 cm   LV PW: 0.8 cm   LV EDV/LV EDV Index: 69.9 ml/ 44 ml/m^2   LV ESV/LV ESV Index: 23.1 ml/ 14 ml/m^2      Aortic Root Dimension: 2.8 cm   Ascending Aorta:3.3 cm      LA Dimension: 3.4 cm   LA volume/Index: 26.2 ml /16 m^2     Aortic Valve Mitral Valve      AV Peak Velocity: 1.21 m/s          MV Peak E-Wave: 0.71 m/s   AV Peak Gradient: 5.86 mmHg         MV Peak A-Wave: 1.07 m/s   AV Mean Gradient: 3 mmHg            MV E/A Ratio: 0.67   LVOT Peak Velocity: 1.05 m/s        MV P1/2t: 61 msec   LVOT: 2.2 cm                        MV Mean Gradient: 2 mmHg   AV Area (Continuity):3.22 cm^2      MV Area (continuity): 2.11 cm^2                                       MV Deceleration Time: 208 msec                                       MV Area (PHT): 3.61 cm^2   Tricuspid Valve                     E' Septal Velocity: 0.05 m/s   TR Velocity:2.02 m/s                E' Lateral Velocity: 0.07 m/s   TR Gradient:16.32 mmHg                                          Pulmonic Valve                                       PV Peak Velocity: 0.88 m/s                                       PV Peak Gradient: 3.06 mmHg                                       PV Mean Gradient:2 mmHg      Findings      Mitral Valve   Normal mitral valve structure and function.   No evidence of mitral valve stenosis.   No evidence of mitral regurgitation.      Aortic Valve   Calcified, thickened, tricuspid valve.   Mild aortic regurgitation.   No aortic stenosis.      Tricuspid Valve   Normal tricuspid valve structure and function.   Normal pulmonary artery pressure. Estimated PASP 24 mmHg.      Pulmonic Valve   The pulmonic valve was not well visualized .      Left Atrium   Normal left atrium.      Left Ventricle   Normal left ventricle size, thickness and systolic function.   LVEF 60-65 %   E/A flow reversal noted. Suggestive of diastolic dysfunction.      Right Atrium   Normal right atrium.      Right Ventricle   Normal right ventricle structure and function.      Pericardial Effusion   No evidence of any pericardial effusion.      Miscellaneous   Dilation of ascending aorta 3.3 cm, indexed to BSA 2cm/m2.      Summary   Normal left ventricle size, thickness and systolic function.   LVEF 60-65 %   Reduced LV compliance.   Normal LA and right cavities size.   Aortic valve sclerosis without significant stenosis.   Mild aortic regurgitation.   Normal pulmonary artery pressure.   No evidence of pericardial effusion.   Dilation of ascending aorta 3.3 cm, indexed to BSA 2cm/m2.      Signature      ----------------------------------------------------------------   Electronically signed by Jimmy Moya MD (Interpreting   physician) on 05/14/2021 at 10:23 AM                                                                                                                                               Status:Open                                                        Carotid Duplex Study     Demographics                                                        Procedure Staff   Patient name: LESLYE WEAVER                                    Ordering physician:           [facility] CARDIOLOGY   Patient ID:   [id number]                                                                       Referring Physician:          Goldberg,Steven MD   Gender:            Female                                           Interpreting physician:       Jimmy Moya MD   YOB: 1945                                        Sonographer:                  Marcin Corral [facility], [facility]      Procedure Information  Procedure date: 5/14/2021 7:18 AM                                   Accession no:        [id number]    Carotid Procedure Findings                                Right                                             Left   Location                   PSV (cm/s)   EDV (cm/s)        Stenosis %         PSV (cm/s)             EDV (cm/s)     Stenosis %   Prox CCA                   83.7         15.7              0%                 74.2                   20.8           0%   Mid CCA                    80.3         23.5              0%                 71.3                   21.4           0%   Dist CCA                   68.1         18.3              0%                 82.6                   23.8           0%   Bulb                       60.1         15.5              0%                 58.2                   11.3           0%   Prox ICA                   77.1         19.9              0%                 73.6                   23.2           0%   Mid ICA                    89.2         25.7              0%                 107                    39.2           16-49%   Dist ICA                   51.9         18.4              0%                 48                     14.5           0%   Prox ECA                   56.7         11.5              0%                 77.2                   20.2           0%   Vertebral                  74           18.9              0%                 58.8                   13.3           0%   Mid Subclavian             81.3                           0%                 85.3                                  0%  Right ICA/CCA ratio: 1.31                                                         Left ICA/CCA ratio: 1.3  Right vertebral flow:                                               Left vertebral flow:  Antegrade                                                           Antegrade      Findings:  Color Doppler imaging of the bilateral common carotid, bulb, internal and external carotids showed mild to moderate  heterogenous plaque in left internal carotid artery. No evidence of hemodynamically significant stenosis bilaterally. Vertebrals  were antegrade bilaterally.          Patient name: LESLYE WEAVER                                                      5/14/2021 7:18 AM                                                                      Date of                                                                      study:  Encounter#: [id number]                                                                                                                                 Page 1/2  Accession#: [id number]                                                [facility]                                                     [address] [place] [address]                                                                    Tel: [phone]                                                                    Fax: [phone]      Physician Conclusions  Summary:  1. MILD TO MODERATE LEFT INTERNAL CAROTID ARTERY STENOSIS 16-49%.    Electronically signed by Jimmy Moya MD (Interpreting physician) on 5/14/2021 at 10:27 AM       This is a pleasant 76-year-old female with a past medical history of hypertension, hyperlipidemia, mild intermittent asthma, osteoarthritis of the knees, who presented to the [facility] emergency room during the late evening hours of July 30th, 2021, after having experienced several episodes of syncope at home earlier in the day, which eventually resulted in a fall.  The patient, when questioned about the timing, duration, associated symptoms, and surrounding context of her syncopal episodes + fall, cannot precisely recall the exact details surrounding her syncopal episodes, nor does she remember the actual fall; she does recalls being assisted by her , who apparently found her on the ground when he came home, to the restroom where she had several episodes of vomiting "dark blood".  Of note, when pt saw Dr. Steven Goldberg on September 30, 2020, she reported to him that on September 28, 2020, she had been fasting for the Taoism holiday. At 4PM she felt clammy, sweaty and nauseous. She had some water with pounding headache. She gradually felt better but it took 4hrs to get back to herself. Her back was achy as well. The next day she walked 4.8 miles instead of 5 miles because she felt tired. She reported no chest pain or palpitations. She did not syncopize or fall that day.  This episodes was attributed to vasovagal syncope and did not recur.  The patient also follows Dr. Pedro Pablo Reid (neurology) who ordered an MRI of the brain without contrast on 3/23/21 which showed mild cerebral atrophy with proportional ventricular dilatation; there was no acute infarct, bleed or mass effect.  MRA brain without contrast that same day revealed no large-vessel occlusions or aneurysm.  MRA neck without contrast that same day revealed no evidence of carotid artery stenosis; both vertebral arteries were patent.  Pt underwent Holter monitor in May 2021 which, according to Dr. Goldberg's office notation, showed "nsr, rare pvc, no pauses".              Past Medical History:     Reviewed history from 12/09/2019 and no changes required:        Hypertension        Hyperlipidemia        [-] Diabetes        Chronic hiatus hernia, questionably since childhood.        Chronic venous insufficiency        Mild intermittent asthma        Knee OA, mild        Increased ferritin, negative hematology workup.        8/19 holter sinus rhythm        9/19 Stress echo borderline EKG changes, nrml echo, nrml stress        anticipated left eye cataract  12/17/19 dr Cleary    Past Surgical History:     Reviewed history from 08/19/2019 and no changes required:        [facility]        Previous D&C.        Strabismus eye surgery, age 1          Family History Summary:      Reviewed history Last on 02/03/2021 and no changes required:05/19/2021      General Comments - FH:  Mother -- Dec'd 81, dementia.  Father -- Dec'd 64, COPD, MI.  Sister -- 65, good health.  Son -- 51, colon cancer, doing well.  Daughter -- 48, good health.    Social History:     Reviewed history from 08/19/2019 and no changes required:        .        Nonsmoker.        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          REVIEW OF SYSTEMS:    CONSTITUTIONAL: (-) dizziness (-) weakness (-) fevers (-) chills (-) weight loss (-) weight gain (-) sweats (-) poor appetite (+) falls (+) syncope  HEENT: (-) visual changes (+) HA (-) vertigo (-) rhinorrhea (-) epistaxis (-) swelling (-) hearing changes (-) sore throat (-) hoarseness  NECK: (-) stiffness (-) pain  RESPIRATORY: (-) cough (-) SOB (-) FROST (-) hemoptysis   CARDIOVASCULAR: (-) chest pain (-) palpitations (-) PND (-) orthopnea  GASTROINTESTINAL: (-) abd pain (-) nausea (-) vomiting (-) diarrhea (-) constipation (-) hematemesis (-) melena (-) BRBPR (-) dysphagia (-) odynophagia (-) incontinence (-) bloatedness  GENITOURINARY: (-) dysuria  (-) frequency (-) hematuria (-) incontinence (-) abnormal discharge (-) incomplete emptying (-) flank pain  NEUROLOGICAL: (-) confusion (-) slurred speech (-) focal weakness (-) tingling/numbness (-) difficulty walking (-) tremors  MUSCULOSKELETAL: (-) back pain (-) joint pain (-) joint swelling (-) reduced ROM (-) extremity pain (-) extremity swelling  PSYCH: (-) anxious (-) depressed (-) aural hallucinations (-) visual hallucinations  SKIN: (-) itching (-) burning (-) rashes (-) swelling (-) bruising (-) pain  All other review of systems is negative unless indicated above.            Transthoracic Echocardiography Report (TTE)     Patient Name LESLYE ARGUELLO  Accession #          [id number]                B      MRN #        [id number]            Date of Study        05/14/2021      Date of      1945        Referring Physician  Goldberg,Steven MD   Birth      Age          75 year(s)        Sonographer          Marcin Corral [facility], [facility]      Gender       Female            Interpreting         Jimmy Moya MD                                  Physician     Height: 63 inches Weight: 128 pounds BSA: 1.6 m^2 BMI: 22.67 kg/m^2    M-Mode/2D Measurements DERIVED VARIABLES      LVID Diastole: 4.9 cm                                    FS34.69 %   LVID Systole: 3.2 cm                                     EF Pzhugm83 %   LV Septum: 0.8 cm   LV PW: 0.8 cm   LV EDV/LV EDV Index: 69.9 ml/ 44 ml/m^2   LV ESV/LV ESV Index: 23.1 ml/ 14 ml/m^2      Aortic Root Dimension: 2.8 cm   Ascending Aorta:3.3 cm      LA Dimension: 3.4 cm   LA volume/Index: 26.2 ml /16 m^2     Aortic Valve Mitral Valve      AV Peak Velocity: 1.21 m/s          MV Peak E-Wave: 0.71 m/s   AV Peak Gradient: 5.86 mmHg         MV Peak A-Wave: 1.07 m/s   AV Mean Gradient: 3 mmHg            MV E/A Ratio: 0.67   LVOT Peak Velocity: 1.05 m/s        MV P1/2t: 61 msec   LVOT: 2.2 cm                        MV Mean Gradient: 2 mmHg   AV Area (Continuity):3.22 cm^2      MV Area (continuity): 2.11 cm^2                                       MV Deceleration Time: 208 msec                                       MV Area (PHT): 3.61 cm^2   Tricuspid Valve                     E' Septal Velocity: 0.05 m/s   TR Velocity:2.02 m/s                E' Lateral Velocity: 0.07 m/s   TR Gradient:16.32 mmHg                                          Pulmonic Valve                                       PV Peak Velocity: 0.88 m/s                                       PV Peak Gradient: 3.06 mmHg                                       PV Mean Gradient:2 mmHg      Findings      Mitral Valve   Normal mitral valve structure and function.   No evidence of mitral valve stenosis.   No evidence of mitral regurgitation.      Aortic Valve   Calcified, thickened, tricuspid valve.   Mild aortic regurgitation.   No aortic stenosis.      Tricuspid Valve   Normal tricuspid valve structure and function.   Normal pulmonary artery pressure. Estimated PASP 24 mmHg.      Pulmonic Valve   The pulmonic valve was not well visualized .      Left Atrium   Normal left atrium.      Left Ventricle   Normal left ventricle size, thickness and systolic function.   LVEF 60-65 %   E/A flow reversal noted. Suggestive of diastolic dysfunction.      Right Atrium   Normal right atrium.      Right Ventricle   Normal right ventricle structure and function.      Pericardial Effusion   No evidence of any pericardial effusion.      Miscellaneous   Dilation of ascending aorta 3.3 cm, indexed to BSA 2cm/m2.      Summary   Normal left ventricle size, thickness and systolic function.   LVEF 60-65 %   Reduced LV compliance.   Normal LA and right cavities size.   Aortic valve sclerosis without significant stenosis.   Mild aortic regurgitation.   Normal pulmonary artery pressure.   No evidence of pericardial effusion.   Dilation of ascending aorta 3.3 cm, indexed to BSA 2cm/m2.      Signature      ----------------------------------------------------------------   Electronically signed by Jimmy Moya MD (Interpreting   physician) on 05/14/2021 at 10:23 AM                                                                                                                                               Status:Open                                                        Carotid Duplex Study     Demographics                                                        Procedure Staff   Patient name: LESLYE WEAVER                                    Ordering physician:           [facility] CARDIOLOGY   Patient ID:   [id number]                                                                       Referring Physician:          Goldberg,Steven MD   Gender:            Female                                           Interpreting physician:       Jimmy Moya MD   YOB: 1945                                        Sonographer:                  Marcin Corral, RV[facility]      Procedure Information  Procedure date: 5/14/2021 7:18 AM                                   Accession no:        [id number]    Carotid Procedure Findings                                Right                                             Left   Location                   PSV (cm/s)   EDV (cm/s)        Stenosis %         PSV (cm/s)             EDV (cm/s)     Stenosis %   Prox CCA                   83.7         15.7              0%                 74.2                   20.8           0%   Mid CCA                    80.3         23.5              0%                 71.3                   21.4           0%   Dist CCA                   68.1         18.3              0%                 82.6                   23.8           0%   Bulb                       60.1         15.5              0%                 58.2                   11.3           0%   Prox ICA                   77.1         19.9              0%                 73.6                   23.2           0%   Mid ICA                    89.2         25.7              0%                 107                    39.2           16-49%   Dist ICA                   51.9         18.4              0%                 48                     14.5           0%   Prox ECA                   56.7         11.5              0%                 77.2                   20.2           0%   Vertebral                  74           18.9              0%                 58.8                   13.3           0%   Mid Subclavian             81.3                           0%                 85.3                                  0%  Right ICA/CCA ratio: 1.31                                                         Left ICA/CCA ratio: 1.3  Right vertebral flow:                                               Left vertebral flow:  Antegrade                                                           Antegrade      Findings:  Color Doppler imaging of the bilateral common carotid, bulb, internal and external carotids showed mild to moderate  heterogenous plaque in left internal carotid artery. No evidence of hemodynamically significant stenosis bilaterally. Vertebrals  were antegrade bilaterally.          Patient name: LESLYE WEAVER                                                      5/14/2021 7:18 AM                                                                      Date of                                                                      study:  Encounter#: [id number]                                                                                                                                 Page 1/2  Accession#: [id number]                                                [facility]                                                     [address] [place] [address]                                                                    Tel: [phone]                                                                    Fax: [phone]      Physician Conclusions  Summary:  1. MILD TO MODERATE LEFT INTERNAL CAROTID ARTERY STENOSIS 16-49%.    Electronically signed by Jimmy Moya MD (Interpreting physician) on 5/14/2021 at 10:27 AM       This is a pleasant 76-year-old female with a past medical history of hypertension, hyperlipidemia, mild intermittent asthma, osteoarthritis of the knees, who presented to the [facility] emergency room during the late evening hours of July 30th, 2021, after having experienced several episodes of syncope at home earlier in the day, which eventually resulted in a fall.  The patient, when questioned about the timing, duration, associated symptoms, and surrounding context of her syncopal episodes + fall, cannot precisely recall the exact details surrounding her syncopal episodes, nor does she remember the actual fall; she does recalls being assisted by her , who apparently found her on the ground when he came home, to the restroom where she had several episodes of vomiting "dark blood".  Of note, when pt saw Dr. Steven Goldberg on September 30, 2020, she reported to him that on September 28, 2020, she had been fasting for the Catholic holiday. At 4PM she felt clammy, sweaty and nauseous. She had some water with pounding headache. She gradually felt better but it took 4hrs to get back to herself. Her back was achy as well. The next day she walked 4.8 miles instead of 5 miles because she felt tired. She reported no chest pain or palpitations. She did not syncopize or fall that day.  This episodes was attributed to vasovagal syncope and did not recur.  The patient also follows Dr. Pedro Pablo Reid (neurology) who ordered an MRI of the brain without contrast on 3/23/21 which showed mild cerebral atrophy with proportional ventricular dilatation; there was no acute infarct, bleed or mass effect.  MRA brain without contrast that same day revealed no large-vessel occlusions or aneurysm.  MRA neck without contrast that same day revealed no evidence of carotid artery stenosis; both vertebral arteries were patent.  Pt underwent Holter monitor in May 2021 which, according to Dr. Goldberg's office notation, showed "nsr, rare pvc, no pauses".              Past Medical History:     Reviewed history from 12/09/2019 and no changes required:        Hypertension        Hyperlipidemia        [-] Diabetes        Chronic hiatus hernia, questionably since childhood.        Chronic venous insufficiency        Mild intermittent asthma        Knee OA, mild        Increased ferritin, negative hematology workup.        8/19 holter sinus rhythm        9/19 Stress echo borderline EKG changes, nrml echo, nrml stress        anticipated left eye cataract  12/17/19 dr Cleary    Past Surgical History:     Reviewed history from 08/19/2019 and no changes required:        [facility]        Previous D&C.        Strabismus eye surgery, age 1          Family History Summary:      Reviewed history Last on 02/03/2021 and no changes required:05/19/2021      General Comments - FH:  Mother -- Dec'd 81, dementia.  Father -- Dec'd 64, COPD, MI.  Sister -- 65, good health.  Son -- 51, colon cancer, doing well.  Daughter -- 48, good health.    Social History:     Reviewed history from 08/19/2019 and no changes required:        .        Nonsmoker.        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          REVIEW OF SYSTEMS:    CONSTITUTIONAL: (-) dizziness (-) weakness (-) fevers (-) chills (-) weight loss (-) weight gain (-) sweats (-) poor appetite (+) falls (+) syncope  HEENT: (-) visual changes (+) HA (-) vertigo (-) rhinorrhea (-) epistaxis (-) swelling (-) hearing changes (-) sore throat (-) hoarseness  NECK: (-) stiffness (-) pain  RESPIRATORY: (-) cough (-) SOB (-) FROST (-) hemoptysis   CARDIOVASCULAR: (-) chest pain (-) palpitations (-) PND (-) orthopnea  GASTROINTESTINAL: (-) abd pain (-) nausea (-) vomiting (-) diarrhea (-) constipation (-) hematemesis (-) melena (-) BRBPR (-) dysphagia (-) odynophagia (-) incontinence (-) bloatedness  GENITOURINARY: (-) dysuria  (-) frequency (-) hematuria (-) incontinence (-) abnormal discharge (-) incomplete emptying (-) flank pain  NEUROLOGICAL: (-) confusion (-) slurred speech (-) focal weakness (-) tingling/numbness (-) difficulty walking (-) tremors  MUSCULOSKELETAL: (-) back pain (-) joint pain (-) joint swelling (-) reduced ROM (-) extremity pain (-) extremity swelling  PSYCH: (-) anxious (-) depressed (-) aural hallucinations (-) visual hallucinations  SKIN: (-) itching (-) burning (-) rashes (-) swelling (-) bruising (-) pain  All other review of systems is negative unless indicated above.            Transthoracic Echocardiography Report (TTE)     Patient Name LESLYE ARGUELLO  Accession #          [id number]                B      MRN #        [id number]            Date of Study        05/14/2021      Date of      1945        Referring Physician  Goldberg,Steven MD   Birth      Age          75 year(s)        Sonographer          Marcin Corral [facility], [facility]      Gender       Female            Interpreting         Jimmy Moya MD                                  Physician     Height: 63 inches Weight: 128 pounds BSA: 1.6 m^2 BMI: 22.67 kg/m^2    M-Mode/2D Measurements DERIVED VARIABLES      LVID Diastole: 4.9 cm                                    FS34.69 %   LVID Systole: 3.2 cm                                     EF Fmjjkc02 %   LV Septum: 0.8 cm   LV PW: 0.8 cm   LV EDV/LV EDV Index: 69.9 ml/ 44 ml/m^2   LV ESV/LV ESV Index: 23.1 ml/ 14 ml/m^2      Aortic Root Dimension: 2.8 cm   Ascending Aorta:3.3 cm      LA Dimension: 3.4 cm   LA volume/Index: 26.2 ml /16 m^2     Aortic Valve Mitral Valve      AV Peak Velocity: 1.21 m/s          MV Peak E-Wave: 0.71 m/s   AV Peak Gradient: 5.86 mmHg         MV Peak A-Wave: 1.07 m/s   AV Mean Gradient: 3 mmHg            MV E/A Ratio: 0.67   LVOT Peak Velocity: 1.05 m/s        MV P1/2t: 61 msec   LVOT: 2.2 cm                        MV Mean Gradient: 2 mmHg   AV Area (Continuity):3.22 cm^2      MV Area (continuity): 2.11 cm^2                                       MV Deceleration Time: 208 msec                                       MV Area (PHT): 3.61 cm^2   Tricuspid Valve                     E' Septal Velocity: 0.05 m/s   TR Velocity:2.02 m/s                E' Lateral Velocity: 0.07 m/s   TR Gradient:16.32 mmHg                                          Pulmonic Valve                                       PV Peak Velocity: 0.88 m/s                                       PV Peak Gradient: 3.06 mmHg                                       PV Mean Gradient:2 mmHg      Findings      Mitral Valve   Normal mitral valve structure and function.   No evidence of mitral valve stenosis.   No evidence of mitral regurgitation.      Aortic Valve   Calcified, thickened, tricuspid valve.   Mild aortic regurgitation.   No aortic stenosis.      Tricuspid Valve   Normal tricuspid valve structure and function.   Normal pulmonary artery pressure. Estimated PASP 24 mmHg.      Pulmonic Valve   The pulmonic valve was not well visualized .      Left Atrium   Normal left atrium.      Left Ventricle   Normal left ventricle size, thickness and systolic function.   LVEF 60-65 %   E/A flow reversal noted. Suggestive of diastolic dysfunction.      Right Atrium   Normal right atrium.      Right Ventricle   Normal right ventricle structure and function.      Pericardial Effusion   No evidence of any pericardial effusion.      Miscellaneous   Dilation of ascending aorta 3.3 cm, indexed to BSA 2cm/m2.      Summary   Normal left ventricle size, thickness and systolic function.   LVEF 60-65 %   Reduced LV compliance.   Normal LA and right cavities size.   Aortic valve sclerosis without significant stenosis.   Mild aortic regurgitation.   Normal pulmonary artery pressure.   No evidence of pericardial effusion.   Dilation of ascending aorta 3.3 cm, indexed to BSA 2cm/m2.      Signature      ----------------------------------------------------------------   Electronically signed by Jimmy Moya MD (Interpreting   physician) on 05/14/2021 at 10:23 AM                                                                                                                                               Status:Open                                                        Carotid Duplex Study     Demographics                                                        Procedure Staff   Patient name: LESLYE WEAVER                                    Ordering physician:           [facility] CARDIOLOGY   Patient ID:   [id number]                                                                       Referring Physician:          Goldberg,Steven MD   Gender:            Female                                           Interpreting physician:       Jimmy Moya MD   YOB: 1945                                        Sonographer:                  Marcin Corral, RVS      Procedure Information  Procedure date: 5/14/2021 7:18 AM                                   Accession no:        [id number]    Carotid Procedure Findings                                Right                                             Left   Location                   PSV (cm/s)   EDV (cm/s)        Stenosis %         PSV (cm/s)             EDV (cm/s)     Stenosis %   Prox CCA                   83.7         15.7              0%                 74.2                   20.8           0%   Mid CCA                    80.3         23.5              0%                 71.3                   21.4           0%   Dist CCA                   68.1         18.3              0%                 82.6                   23.8           0%   Bulb                       60.1         15.5              0%                 58.2                   11.3           0%   Prox ICA                   77.1         19.9              0%                 73.6                   23.2           0%   Mid ICA                    89.2         25.7              0%                 107                    39.2           16-49%   Dist ICA                   51.9         18.4              0%                 48                     14.5           0%   Prox ECA                   56.7         11.5              0%                 77.2                   20.2           0%   Vertebral                  74           18.9              0%                 58.8                   13.3           0%   Mid Subclavian             81.3                           0%                 85.3                                  0%  Right ICA/CCA ratio: 1.31                                                         Left ICA/CCA ratio: 1.3  Right vertebral flow:                                               Left vertebral flow:  Antegrade                                                           Antegrade      Findings:  Color Doppler imaging of the bilateral common carotid, bulb, internal and external carotids showed mild to moderate  heterogenous plaque in left internal carotid artery. No evidence of hemodynamically significant stenosis bilaterally. Vertebrals  were antegrade bilaterally.          Patient name: LESLYE WEAVER                                                      5/14/2021 7:18 AM                                                                      Date of                                                                      study:  Encounter#: [id number]                                                                                                                                 Page 1/2  Accession#: [id number]                                                [facility]                                                     [address] [place] [address]                                                                    Tel: [phone]                                                                    Fax: [phone]      Physician Conclusions  Summary:  1. MILD TO MODERATE LEFT INTERNAL CAROTID ARTERY STENOSIS 16-49%.    Electronically signed by Jimmy Moya MD (Interpreting physician) on 5/14/2021 at 10:27 AM            Patient: SAUNDRA GAVIN  ID: [id number]  Note: All result statuses are Final unless otherwise noted.    Tests: (1) CBC 5 PART DIFF (2A)    WBC                       8.2 K/uL                    (4.0-11.0)    RBC                       4.72 10*6/uL                (3.80-5.80)    HGB                       13.5 g/dl                   (11.5-16.5)    HCT                       44.2 %                      (35.0-48.0)    MCV                       94 fL                       (75.0-100.0)    MCH                       28.6 pg                     (26.0-34.0)    MCHC                 [L]  30.5 g/dl                   (31.0-38.0)    RDW-CV                    13.1 %                      (10.5-18.0)    PLT                       290 K/uL                    (130-500)    NEUTRO%                   65.6 %                      (40.0-75.0)    LYMPH%                    19.7 %                      (15.0-40.0)    MONO%                [H]  11.9 %                      (3.0-11.0)    EOSINO %                  1.7 %                       (1.0-5.0)    BASO %                    0.5 %                       (0.0-1.5)    NEUTRO #                  5.41 K/uL                   (1.80-7.40)    LYMPH #                   1.62 K/uL                   (0.60-3.40)    MONO #               [H]  0.98 K/uL                   (0.11-0.90)    EOSINO #                  0.14 K/uL                   (0.00-0.50)    BASO #                    0.04 K/uL                   (0.00-0.21)    MPV                       9.9 fL                      (6.5-11.9)  ! ImGran%                   0.60 %                      (0.0-1.5)  ! ImmGran#                  0.05 K/uL                   (0.00-0.2)      Updated reference ranges effective date 2/5/2021    Tests: (2) COMPREHEN METABOLIC PANEL (12A)    GLUCOSE                   91 mg/dL                    ()    BUN                       19.2 mg/dL                  (6-20)    CREATININE                0.9 mg/dL                   (0.7-1.2)    e-GFR                     64.70 ml/min                (>60)      If -American result is: >60    CALCIUM                   8.6 mg/dl                   (8.4-10.5)    ALBUMIN                   4.2 g/dL                    (3.5-5.2)    PROTEIN TOTAL             6.2 g/dl                    (6.0-8.3)    ALT/SGPT                  22 U/L                      (10-33)    AST/SGOT                  22 U/L                      (10-32)    ALK PHOS                  89 U/L                      ()    SODIUM (SERUM)       [L]  135 mmol/l                  (136-145)    POTASSIUM (SERUM)         3.6 mmol/l                  (3.5-5.5)    CHLORIDE             [L]  97 mmol/l                   ()    CARBON DIOXIDE            27.4 mmol/l                 (22-29)    BILI TOTAL                0.44 mg/dl                  (0.0-1.2)    Note: An exclamation rick (!) indicates a result that was not dispersed into the flowsheet.  Document Creation Date: 05/04/2021 10:53 PM  _______________________________________________________________________       This is a pleasant 76-year-old female with a past medical history of hypertension, hyperlipidemia, mild intermittent asthma, osteoarthritis of the knees, who presented to the [facility] emergency room during the late evening hours of July 30th, 2021, after having experienced several episodes of syncope at home earlier in the day, which eventually resulted in a fall.  The patient, when questioned about the timing, duration, associated symptoms, and surrounding context of her syncopal episodes + fall, cannot precisely recall the exact details surrounding her syncopal episodes, nor does she remember the actual fall; she does recalls being assisted by her , who apparently found her on the ground when he came home, to the restroom where she had several episodes of vomiting "dark blood".  Of note, when pt saw Dr. Steven Goldberg on September 30, 2020, she reported to him that on September 28, 2020, she had been fasting for the Tenri[place] holiday. At 4PM she felt clammy, sweaty and nauseous. She had some water with pounding headache. She gradually felt better but it took 4hrs to get back to herself. Her back was achy as well. The next day she walked 4.8 miles instead of 5 miles because she felt tired. She reported no chest pain or palpitations. She did not syncopize or fall that day.  This episodes was attributed to vasovagal syncope and did not recur.  The patient also follows Dr. Pedro Pablo Reid (neurology) who ordered an MRI of the brain without contrast on 3/23/21 which showed mild cerebral atrophy with proportional ventricular dilatation; there was no acute infarct, bleed or mass effect.  MRA brain without contrast that same day revealed no large-vessel occlusions or aneurysm.  MRA neck without contrast that same day revealed no evidence of carotid artery stenosis; both vertebral arteries were patent.  Pt underwent Holter monitor in May 2021 which, according to Dr. Goldberg's office notation, showed "nsr, rare pvc, no pauses".              Past Medical History:     Reviewed history from 12/09/2019 and no changes required:        Hypertension        Hyperlipidemia        [-] Diabetes        Chronic hiatus hernia, questionably since childhood.        Chronic venous insufficiency        Mild intermittent asthma        Knee OA, mild        Increased ferritin, negative hematology workup.        8/19 holter sinus rhythm        9/19 Stress echo borderline EKG changes, nrml echo, nrml stress        anticipated left eye cataract  12/17/19 dr Cleary    Past Surgical History:     Reviewed history from 08/19/2019 and no changes required:        [facility]        Previous D&C.        Strabismus eye surgery, age 1          Family History Summary:      Reviewed history Last on 02/03/2021 and no changes required:05/19/2021      General Comments - FH:  Mother -- Dec'd 81, dementia.  Father -- Dec'd 64, COPD, MI.  Sister -- 65, good health.  Son -- 51, colon cancer, doing well.  Daughter -- 48, good health.    Social History:     Reviewed history from 08/19/2019 and no changes required:        .        Nonsmoker.        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          REVIEW OF SYSTEMS:    CONSTITUTIONAL: (-) dizziness (-) weakness (-) fevers (-) chills (-) weight loss (-) weight gain (-) sweats (-) poor appetite (+) falls (+) syncope  HEENT: (-) visual changes (+) HA (-) vertigo (-) rhinorrhea (-) epistaxis (-) swelling (-) hearing changes (-) sore throat (-) hoarseness  NECK: (-) stiffness (-) pain  RESPIRATORY: (-) cough (-) SOB (-) FROST (-) hemoptysis   CARDIOVASCULAR: (-) chest pain (-) palpitations (-) PND (-) orthopnea  GASTROINTESTINAL: (-) abd pain (-) nausea (-) vomiting (-) diarrhea (-) constipation (-) hematemesis (-) melena (-) BRBPR (-) dysphagia (-) odynophagia (-) incontinence (-) bloatedness  GENITOURINARY: (-) dysuria  (-) frequency (-) hematuria (-) incontinence (-) abnormal discharge (-) incomplete emptying (-) flank pain  NEUROLOGICAL: (-) confusion (-) slurred speech (-) focal weakness (-) tingling/numbness (-) difficulty walking (-) tremors  MUSCULOSKELETAL: (-) back pain (-) joint pain (-) joint swelling (-) reduced ROM (-) extremity pain (-) extremity swelling  PSYCH: (-) anxious (-) depressed (-) aural hallucinations (-) visual hallucinations  SKIN: (-) itching (-) burning (-) rashes (-) swelling (-) bruising (-) pain  All other review of systems is negative unless indicated above.      Vital Signs Last 24 Hrs  T(C): 36.7 (30 Jul 2021 08:48)  T(F): 98.1 (30 Jul 2021 08:48)  HR: 72 (30 Jul 2021 08:48) (55 - 72)  BP: 150/84 (30 Jul 2021 08:48) (122/60 - 155/74)  BP(mean): --  RR: 18 (30 Jul 2021 08:48) (18 - 18)  SpO2: 98% (30 Jul 2021 08:48) (96% - 99%)    I&O's Summary    07-29-21 @ 07:01  -  07-30-21 @ 07:00  --------------------------------------------------------  IN: 450 mL / OUT: 1650 mL / NET: -1200 mL      GENERAL: NAD  HEAD:  Ecchymosis left eye, Normocephalic  EYES: EOMI, PERRLA, conjunctiva and sclera clear  NECK: Supple, No JVD, No LAD, No carotid bruits, No thyromegaly  CHEST/LUNG: Clear to auscultation bilaterally; Normal respiratory effort w/o intercostal retractions  HEART: Regular rate and rhythm; nl S1/S2; No murmurs, rubs, or gallops  ABDOMEN: Soft, Nontender, Nondistended; Bowel sounds present; No hepatosplenomegaly  EXTREMITIES:  2+ Peripheral Pulses; No clubbing, cyanosis, or edema b/l; Nl ROM  SKIN: Ecchymoses b/l knees and left elbow; No jaundice; Normal turgor, texture  NEURO: A+O x 3; nonfocal CN/motor/sensory/reflexes; speech + comprehension are intact  PSYCH: Nl affect; no delirium or agitation; no suicidal/homicidal ideation      Transthoracic Echocardiography Report (TTE)     Patient Name LESLYE ARGUELLO  Accession #          [id number]                B      MRN #        [id number]            Date of Study        05/14/2021      Date of      1945        Referring Physician  Goldberg,Steven MD   Birth      Age          75 year(s)        Sonographer          Marcin Corral RCS, RVS      Gender       Female            Interpreting         Jimmy Moya MD                                  Physician     Height: 63 inches Weight: 128 pounds BSA: 1.6 m^2 BMI: 22.67 kg/m^2    M-Mode/2D Measurements DERIVED VARIABLES      LVID Diastole: 4.9 cm                                    FS34.69 %   LVID Systole: 3.2 cm                                     EF Tljwjq17 %   LV Septum: 0.8 cm   LV PW: 0.8 cm   LV EDV/LV EDV Index: 69.9 ml/ 44 ml/m^2   LV ESV/LV ESV Index: 23.1 ml/ 14 ml/m^2      Aortic Root Dimension: 2.8 cm   Ascending Aorta:3.3 cm      LA Dimension: 3.4 cm   LA volume/Index: 26.2 ml /16 m^2     Aortic Valve Mitral Valve      AV Peak Velocity: 1.21 m/s          MV Peak E-Wave: 0.71 m/s   AV Peak Gradient: 5.86 mmHg         MV Peak A-Wave: 1.07 m/s   AV Mean Gradient: 3 mmHg            MV E/A Ratio: 0.67   LVOT Peak Velocity: 1.05 m/s        MV P1/2t: 61 msec   LVOT: 2.2 cm                        MV Mean Gradient: 2 mmHg   AV Area (Continuity):3.22 cm^2      MV Area (continuity): 2.11 cm^2                                       MV Deceleration Time: 208 msec                                       MV Area (PHT): 3.61 cm^2   Tricuspid Valve                     E' Septal Velocity: 0.05 m/s   TR Velocity:2.02 m/s                E' Lateral Velocity: 0.07 m/s   TR Gradient:16.32 mmHg                                          Pulmonic Valve                                       PV Peak Velocity: 0.88 m/s                                       PV Peak Gradient: 3.06 mmHg                                       PV Mean Gradient:2 mmHg      Findings      Mitral Valve   Normal mitral valve structure and function.   No evidence of mitral valve stenosis.   No evidence of mitral regurgitation.      Aortic Valve   Calcified, thickened, tricuspid valve.   Mild aortic regurgitation.   No aortic stenosis.      Tricuspid Valve   Normal tricuspid valve structure and function.   Normal pulmonary artery pressure. Estimated PASP 24 mmHg.      Pulmonic Valve   The pulmonic valve was not well visualized .      Left Atrium   Normal left atrium.      Left Ventricle   Normal left ventricle size, thickness and systolic function.   LVEF 60-65 %   E/A flow reversal noted. Suggestive of diastolic dysfunction.      Right Atrium   Normal right atrium.      Right Ventricle   Normal right ventricle structure and function.      Pericardial Effusion   No evidence of any pericardial effusion.      Miscellaneous   Dilation of ascending aorta 3.3 cm, indexed to BSA 2cm/m2.      Summary   Normal left ventricle size, thickness and systolic function.   LVEF 60-65 %   Reduced LV compliance.   Normal LA and right cavities size.   Aortic valve sclerosis without significant stenosis.   Mild aortic regurgitation.   Normal pulmonary artery pressure.   No evidence of pericardial effusion.   Dilation of ascending aorta 3.3 cm, indexed to BSA 2cm/m2.      Signature      ----------------------------------------------------------------   Electronically signed by Jimmy Moya MD (Interpreting   physician) on 05/14/2021 at 10:23 AM                                                                                                                                               Status:Open                                                        Carotid Duplex Study     Demographics                                                        Procedure Staff   Patient name: LESLYE WEAVER                                    Ordering physician:           [facility] CARDIOLOGY   Patient ID:   [id number]                                                                       Referring Physician:          Goldberg,Steven MD   Gender:            Female                                           Interpreting physician:       Jimmy Moya MD   YOB: 1945                                        Sonographer:                  Marcin Corral, S      Procedure Information  Procedure date: 5/14/2021 7:18 AM                                   Accession no:        [id number]    Carotid Procedure Findings                                Right                                             Left   Location                   PSV (cm/s)   EDV (cm/s)        Stenosis %         PSV (cm/s)             EDV (cm/s)     Stenosis %   Prox CCA                   83.7         15.7              0%                 74.2                   20.8           0%   Mid CCA                    80.3         23.5              0%                 71.3                   21.4           0%   Dist CCA                   68.1         18.3              0%                 82.6                   23.8           0%   Bulb                       60.1         15.5              0%                 58.2                   11.3           0%   Prox ICA                   77.1         19.9              0%                 73.6                   23.2           0%   Mid ICA                    89.2         25.7              0%                 107                    39.2           16-49%   Dist ICA                   51.9         18.4              0%                 48                     14.5           0%   Prox ECA                   56.7         11.5              0%                 77.2                   20.2           0%   Vertebral                  74           18.9              0%                 58.8                   13.3           0%   Mid Subclavian             81.3                           0%                 85.3                                  0%  Right ICA/CCA ratio: 1.31                                                         Left ICA/CCA ratio: 1.3  Right vertebral flow:                                               Left vertebral flow:  Antegrade                                                           Antegrade      Findings:  Color Doppler imaging of the bilateral common carotid, bulb, internal and external carotids showed mild to moderate  heterogenous plaque in left internal carotid artery. No evidence of hemodynamically significant stenosis bilaterally. Vertebrals  were antegrade bilaterally.          Patient name: LESLYE WEAVER                                                      5/14/2021 7:18 AM                                                                      Date of                                                                      study:  Encounter#: [id number]                                                                                                                                 Page 1/2  Accession#: [id number]                                                [facility]                                                     [address] [place] [address] [address] [address]                                                                    Tel: [phone]                                                                    Fax: [phone]      Physician Conclusions  Summary:  1. MILD TO MODERATE LEFT INTERNAL CAROTID ARTERY STENOSIS 16-49%.    Electronically signed by Jimmy Moya MD (Interpreting physician) on 5/14/2021 at 10:27 AM            Patient: SAUNDRA GAVIN  ID: [id number]  Note: All result statuses are Final unless otherwise noted.    Tests: (1) CBC 5 PART DIFF (2A)    WBC                       8.2 K/uL                    (4.0-11.0)    RBC                       4.72 10*6/uL                (3.80-5.80)    HGB                       13.5 g/dl                   (11.5-16.5)    HCT                       44.2 %                      (35.0-48.0)    MCV                       94 fL                       (75.0-100.0)    MCH                       28.6 pg                     (26.0-34.0)    MCHC                 [L]  30.5 g/dl                   (31.0-38.0)    RDW-CV                    13.1 %                      (10.5-18.0)    PLT                       290 K/uL                    (130-500)    NEUTRO%                   65.6 %                      (40.0-75.0)    LYMPH%                    19.7 %                      (15.0-40.0)    MONO%                [H]  11.9 %                      (3.0-11.0)    EOSINO %                  1.7 %                       (1.0-5.0)    BASO %                    0.5 %                       (0.0-1.5)    NEUTRO #                  5.41 K/uL                   (1.80-7.40)    LYMPH #                   1.62 K/uL                   (0.60-3.40)    MONO #               [H]  0.98 K/uL                   (0.11-0.90)    EOSINO #                  0.14 K/uL                   (0.00-0.50)    BASO #                    0.04 K/uL                   (0.00-0.21)    MPV                       9.9 fL                      (6.5-11.9)  ! ImGran%                   0.60 %                      (0.0-1.5)  ! ImmGran#                  0.05 K/uL                   (0.00-0.2)      Updated reference ranges effective date 2/5/2021    Tests: (2) COMPREHEN METABOLIC PANEL (12A)    GLUCOSE                   91 mg/dL                    ()    BUN                       19.2 mg/dL                  (6-20)    CREATININE                0.9 mg/dL                   (0.7-1.2)    e-GFR                     64.70 ml/min                (>60)      If -American result is: >60    CALCIUM                   8.6 mg/dl                   (8.4-10.5)    ALBUMIN                   4.2 g/dL                    (3.5-5.2)    PROTEIN TOTAL             6.2 g/dl                    (6.0-8.3)    ALT/SGPT                  22 U/L                      (10-33)    AST/SGOT                  22 U/L                      (10-32)    ALK PHOS                  89 U/L                      ()    SODIUM (SERUM)       [L]  135 mmol/l                  (136-145)    POTASSIUM (SERUM)         3.6 mmol/l                  (3.5-5.5)    CHLORIDE             [L]  97 mmol/l                   ()    CARBON DIOXIDE            27.4 mmol/l                 (22-29)    BILI TOTAL                0.44 mg/dl                  (0.0-1.2)    Note: An exclamation rikc (!) indicates a result that was not dispersed into the flowsheet.  Document Creation Date: 05/04/2021 10:53 PM  _______________________________________________________________________

## 2021-07-30 NOTE — H&P ADULT - NSHPLABSRESULTS_GEN_ALL_CORE
12.6   15.54 )-----------( 252      ( 29 Jul 2021 23:30 )             38.1     07-29    126<L>  |  90<L>  |  16  ----------------------------<  184<H>  4.1   |  22  |  0.64    Ca    9.4      29 Jul 2021 23:30    TPro  6.9  /  Alb  4.2  /  TBili  0.5  /  DBili  x   /  AST  43<H>  /  ALT  16  /  AlkPhos  99  07-29    PT/INR - ( 29 Jul 2021 23:30 )   PT: 11.5 sec;   INR: 0.96 ratio         PTT - ( 29 Jul 2021 23:30 )  PTT:27.2 sec        CAPILLARY BLOOD GLUCOSE      POCT Blood Glucose.: 160 mg/dL (29 Jul 2021 22:53)

## 2021-07-30 NOTE — CONSULT NOTE ADULT - SUBJECTIVE AND OBJECTIVE BOX
[facility] DIVISION OF KIDNEY DISEASES AND HYPERTENSION -- [phone]  -- INITIAL CONSULT NOTE  --------------------------------------------------------------------------------  HPI:    Pt. is a 76 y.o. F w/o significant PMHx. presenting for unwitnessed syncopal episode. Nephrology consulted for hyponatremia. Pt. had Head CTx 2 showing subdural and subarachnoid hemmorhage with occipital fracture. Pt. presented with Na of 126 which remains unchanged after starting beiung started on 2% Saline @ 30cc/hour in the ED last night.  at bedside assisted in providing history. Pt. reportedly drinking large volumes of water chronically. Pt. denies having any recent medication changes, not having problems with her sodium in the past. Pt. gets yearly checkups with her PCP. Pt. denies use of diuretics. Pt. endorses headaches and nausea. Pt. stating repeatedly that she feels fine and wants to go home.      PAST HISTORY  --------------------------------------------------------------------------------  PAST MEDICAL & SURGICAL HISTORY:    FAMILY HISTORY:    PAST SOCIAL HISTORY:    ALLERGIES & MEDICATIONS  --------------------------------------------------------------------------------  Allergies    No Known Allergies    Intolerances      Standing Inpatient Medications  ALBUTerol  90 MICROgram(s) HFA Inhaler - Peds 1 Puff(s) Inhalation every 6 hours  ATENolol  Tablet 25 milliGRAM(s) Oral daily  atorvastatin 40 milliGRAM(s) Oral once  fluticasone propionate 50 MICROgram(s)/spray Nasal Spray 1 Spray(s) Both Nostrils <User Schedule>  insulin lispro (ADMELOG) corrective regimen sliding scale   SubCutaneous three times a day before meals  insulin lispro (ADMELOG) corrective regimen sliding scale   SubCutaneous at bedtime  levETIRAcetam 500 milliGRAM(s) Oral two times a day  pantoprazole    Tablet 40 milliGRAM(s) Oral before breakfast  sodium chloride 2% . 1000 milliLiter(s) IV Continuous <Continuous>    PRN Inpatient Medications  acetaminophen   Tablet .. 650 milliGRAM(s) Oral every 6 hours PRN  ALPRAZolam 0.25 milliGRAM(s) Oral three times a day PRN  bisacodyl 5 milliGRAM(s) Oral daily PRN  famotidine    Tablet 20 milliGRAM(s) Oral two times a day PRN  meclizine 12.5 milliGRAM(s) Oral Once PRN  montelukast 10 milliGRAM(s) Oral daily PRN  senna 2 Tablet(s) Oral at bedtime PRN      REVIEW OF SYSTEMS  --------------------------------------------------------------------------------  Gen: No fevers/chills  Skin: bruises on L. eye, b/l knee and R. elbow  Head/Eyes/Ears: Normal hearing,   Respiratory: No dyspnea, cough  CV: No chest pain  GI: No abdominal pain, diarrhea  : No dysuria, hematuria  MSK: No  edema  Heme: No easy bruising or bleeding  Psych: No significant depression    All other systems were reviewed and are negative, except as noted.    VITALS/PHYSICAL EXAM  --------------------------------------------------------------------------------  T(C): 37.4 (07-30-21 @ 12:30), Max: 37.4 (07-30-21 @ 12:30)  HR: 61 (07-30-21 @ 12:30) (61 - 78)  BP: 122/60 (07-30-21 @ 12:30) (116/56 - 172/75)  RR: 18 (07-30-21 @ 12:30) (17 - 20)  SpO2: 99% (07-30-21 @ 12:30) (95% - 99%)  Wt(kg): --  Height (cm): 160 (07-29-21 @ 22:42)  Weight (kg): 61.2 (07-29-21 @ 22:42)  BMI (kg/m2): 23.9 (07-29-21 @ 22:42)  BSA (m2): 1.64 (07-29-21 @ 22:42)      Physical Exam:  	Gen: NAD  	HEENT: MMM, L. eye ecchymosis  	Pulm: CTA B/L  	CV: S1S2  	Abd: Soft, +BS   	Ext: No LE edema B/L  	Neuro: Awake  	Skin: Warm and dry, bruises on b/l knee and L. elbow  	  LABS/STUDIES  --------------------------------------------------------------------------------              12.6   15.54 >-----------<  252      [07-29-21 @ 23:30]              38.1     132  |  94  |  12  ----------------------------<  122      [07-30-21 @ 12:09]  3.3   |  26  |  0.68        Ca     9.0     [07-30-21 @ 12:09]    TPro  6.9  /  Alb  4.2  /  TBili  0.5  /  DBili  x   /  AST  43  /  ALT  16  /  AlkPhos  99  [07-29-21 @ 23:30]    PT/INR: PT 11.5 , INR 0.96       [07-29-21 @ 23:30]  PTT: 27.2       [07-29-21 @ 23:30]    Serum Osmolality 264      [07-30-21 @ 02:13]    Creatinine Trend:  SCr 0.68 [07-30 @ 12:09]  SCr 0.68 [07-30 @ 04:33]  SCr 0.64 [07-29 @ 23:30]    Urinalysis - [07-30-21 @ 04:26]      Color Light Yellow / Appearance Slightly Turbid / SG 1.016 / pH 6.5      Gluc Negative / Ketone Negative  / Bili Negative / Urobili Negative       Blood Negative / Protein Negative / Leuk Est Large / Nitrite Negative      RBC 5 / WBC  / Hyaline  / Gran  / Sq Epi  / Non Sq Epi  / Bacteria Few    Urine Creatinine 51      [07-30-21 @ 04:26]  Urine Osmolality 518      [07-30-21 @ 04:26]

## 2021-07-30 NOTE — CHART NOTE - NSCHARTNOTEFT_GEN_A_CORE
CAPRINI SCORE [CLOT] Score on Admission for     AGE RELATED RISK FACTORS                                                       MOBILITY RELATED FACTORS  [ ] Age 41-60 years                                            (1 Point)                  [ ] Bed rest                                                        (1 Point)  [ ] Age 61-74 years                                           (2 Points)                 [ ] Plaster cast                                                   (2 Points)  [x] Age > 75 years                                              (3 Points)                 [ ] Bed bound for more than 72 hours         (2 Points)    DISEASE RELATED RISK FACTORS                                               GENDER SPECIFIC FACTORS  [ ] Edema in the lower extremities                       (1 Point)           [ ] Pregnancy                                                          (1 Point)  [ ] Varicose veins                                               (1 Point)                  [ ] Post-partum < 6 weeks                                   (1 Point)             [ ] BMI > 25 Kg/m2                                            (1 Point)                  [ ] Hormonal therapy  or oral contraception   (1 Point)                 [ ] Sepsis (in the previous month)                        (1 Point)            [ ] History of pregnancy complications             (1 point)  [ ] Pneumonia or serious lung disease                                            [ ] Unexplained or recurrent               (1 Point)           (in the previous month)                               (1 Point)  [ ] Abnormal pulmonary function test                     (1 Point)                 SURGERY RELATED RISK FACTORS (include planned surgeries)  [ ] Acute myocardial infarction                              (1 Point)                 [ ]  Section                                             (1 Point)  [ ] Congestive heart failure (in the previous month)  (1 Point)        [ ] Minor surgery                                                  (1 Point)   [ ] Inflammatory bowel disease                             (1 Point)                 [ ] Arthroscopic surgery                                        (2 Points)  [ ] Central venous access                                      (2 Points)  [ ] History / presence of malignancy                   (2 points)   [ ] General surgery lasting more than 45 minutes   (2 Points)       [ ] Stroke (in the previous month)                          (5 Points)               [ ] Elective arthroplasty                                         (5 Points)                                                                                                                                               HEMATOLOGY RELATED FACTORS                                                 TRAUMA RELATED RISK FACTORS  [ ] Prior episodes of VTE                                     (3 Points)                [ ] Fracture of the hip, pelvis, or leg                       (5 Points)  [ ] Positive family history for VTE                         (3 Points)             [ ] Acute spinal cord injury (in the previous month)  (5 Points)  [ ] Prothrombin 89539 A                                     (3 Points)                [ ] Paralysis  (less than 1 month)                             (5 Points)  [ ] Factor V Leiden                                             (3 Points)                   [x ] Multiple Trauma within 1 month                        (5 Points)  [ ] Lupus anticoagulants                                     (3 Points)                                                           [ ] Anticardiolipin antibodies                               (3 Points)                                                       [ ] High homocysteine in the blood                      (3 Points)                                             [ ] Other congenital or acquired thrombophilia      (3 Points)                                                [ ] Heparin induced thrombocytopenia                  (3 Points)                                          Total Score [   8       ]    Risk:  Very low 0   Low 1 to 2   Moderate 3 to 4   High =5       VTE Prophylaxis Recommendations:  [x ] mechanical pneumatic compression devices                                      [ ] contraindicated: _____________________  [ ] chemoprophylaxis                                                                                    [ x] contraindicated: _____________________    **** HIGH LIKELIHOOD DVT PRESENT ON ADMISSION  [x ] (please order LE dopplers within 24 hours of admission)

## 2021-07-30 NOTE — H&P ADULT - HISTORY OF PRESENT ILLNESS
77yo F h.o HTN/HLD, DM II, Hiatal hernia Venous insufficiency, Asthma, OA b/l knees presents to ED after multiple syncope today with fall. Pt currently AAOx3 however complaining of confusion and headache, cannot remember clearly the events surrounding falls. Accompanied by  who reports patient was found on the ground earlier today when he arrived home, he helped her to the rest room where she subsequently had several episodes of vomitting dark blood

## 2021-07-30 NOTE — PROGRESS NOTE ADULT - ASSESSMENT
ASSESSMENT: 75yo F h.o HTN/HLD, DM II, Hiatal hernia, Venous insufficiency, Asthma, OA b/l knees presents to ED after ?syncopal fall at home - patient does not remember what happened but  at bedside states she was found on the ground earlier when he arrived home, he helped her to the rest room where she subsequently had several episodes of vomiting dark blood. CT head was done and revealed a nondisplaced left occipital fracture, hemorrhagic parenchymal contusions in the medial left cerebellum and inferior bifrontal lobes with mild surrounding vasogenic edema, thin acute subdural hemorrhages along the falx and right frontal temporal convexities, scattered traumatic subarachnoid hemorrhage and tiny amount of fourth ventricle intraventricular hemorrhage. No midline shift.      PLAN:     NEURO:  - Neuro checks every 4 hours  - Repeat CT head in am or sooner if neurologic change  - CTV pending to evaluate sinus   - Keppra 500 BID for seizure prophylaxis  - Tylenol PRN for pain control    PULM:  - Encouraged incentive spirometer  - Tolerating room air  - Continue DuoNeb and Singulair for history of asthma    CV:  - TTE, EKG for syncope w/up  - Tele monitoring  - Cardiology consulted   - Continue Atenolol 25mg daily for hx of HTN  - Continue lipitor for history of HLD    RENAL:  - Hyponatremic to 126, started on 2%NaCl @ 30, will increase to 60 cc / hr and trend BMP q 8  - Nephrology consulted  - Strict I/O    GI:  - Tolerates regular CCD , ensure supplementation as patient with decreased appetite  - GI prophylaxis [] not indicated [x] PPI - hx of hiatal hernia [] other: takes pepcid PRN for GERD  - Bowel regimen [] colace [x] senna [] other:    ENDO:   - Goal euglycemia (-180)  - Start low dose HISS and trend FS TID AC    HEME/ONC:  - VTE prophylaxis: [x] SCDs [] chemoprophylaxis [x] hold chemoprophylaxis due to: fresh hemorrhage [] high risk of DVT/PE on admission due to:    DISPO:  - PT/OT-P  - Will discuss with Dr. Kaminski  - [facility] # [id number]

## 2021-07-30 NOTE — CONSULT NOTE ADULT - ASSESSMENT
Pt. is a 76 y.o. F w/o significant PMHx. presenting for unwitnessed syncopal episode. Nephrology consulted for hyponatremia.    #Hyponatremia   Na. 126 on admission, now 132 (7/30). Goal is 136 by 11:30 (7/30)  Will attempt ot achieve sodium level between 140-150 as Pt. with SDH and SAH.   Monitor for overcorrection by more than 10meq within 24 hours.  c/w 2% Ns @ 30cc/hr  BMP Q8H  Monitor UOP, Please obtain urine electrolytes and urine osmolarity     If you have any questions, please feel free to contact me  Al Mackay  Nephrology Fellow  [phone]  (After 5pm or on weekends please page the on-call fellow)   Pt. is a 76 y.o. F w/o significant PMHx. presenting for unwitnessed syncopal episode. Nephrology consulted for hyponatremia.    #Hyponatremia   Na. 126 on admission, now 132 (7/30). Goal is 136 by 11:30 (7/30)  Will attempt ot achieve sodium level between 140-150 as Pt. with SDH and SAH.   Monitor for overcorrection by more than 10meq within 24 hours.  c/w 2% Ns @ 30cc/hr   Fluid Restriction  BMP Q8H  Monitor UOP, Urine electrolytes and urine osmolarity consistent with SIADH picture given SDH and SAH. 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     If you have any questions, please feel free to contact me  Al Mackay  Nephrology Fellow  [phone]  (After 5pm or on weekends please page the on-call fellow)

## 2021-07-30 NOTE — CONSULT NOTE ADULT - PROBLEM SELECTOR RECOMMENDATION 9
unclear etiology   Monitor on tele   Check orthostatics when able   Check TTE and EKG   Fall precautions

## 2021-07-30 NOTE — CONSULT NOTE ADULT - ASSESSMENT
76F s/p multiple falls today.  multiple syncope today with fall followed by N/V w/ bloody emesis. Cannot remember events, cannot remember if there was LOC. Exam: AAOx3, no drift, EOMI, no facial, WALTERS 5/5. No open scalp lacs. CTH shows R frontal contusion, thin L frontal tSAH, thin R frontal SDH, small amount 4th ventricular IVH and small amount L cerebellar traumatic SAH. L occipital skull fx with ejected bome fragment into region of the torcula, small pneumocephalus.

## 2021-07-30 NOTE — H&P ADULT - ASSESSMENT
Lauren Fitzgerald  76F s/p multiple falls today.  multiple syncope today with fall followed by N/V w/ bloody emesis. Cannot remember events, cannot remember if there was LOC. Exam: AAOx3, no drift, EOMI, no facial, WALTERS 5/5. No open scalp lacs. CTH shows R frontal contusion, thin L frontal tSAH, thin R frontal SDH, small amount 4th ventricular IVH and small amount L cerebellar traumatic SAH. L occipital skull fx with ejected bome fragment into region of the torcula, small pneumocephalus.  -Repeat CTH 6hrs  -F/U final cTH reads  -Adm Dr. Kaminski floor if stable, ICU if expansion  -Keppra 500 BID  -Na 127 corrected, WBC 15, UA pending, ER starting 2% at 30 for Na recheck 4hrs  -Consult hospitalist in AM if patient dispo is floor Lauren Fitzgerald  76F s/p multiple falls today.  multiple syncope today with fall followed by N/V w/ bloody emesis. Cannot remember events, cannot remember if there was LOC. Exam: AAOx3, no drift, EOMI, no facial, WALTERS 5/5. No open scalp lacs. CTH shows R frontal contusion, thin L frontal tSAH, thin R frontal SDH, small amount 4th ventricular IVH and small amount L cerebellar traumatic SAH. L occipital skull fx with ejected bome fragment into region of the torcula, small pneumocephalus.  -Repeat CTH 6hrs, q1 neurochecks pending repeat  -F/U final cTH reads  -Adm Dr. Kaminski floor if stable, ICU if expansion  -Keppra 500 BID  -Na 127 corrected, WBC 15, UA pending, ER starting 2% at 30 for Na recheck 4hrs  -Consult hospitalist in AM if patient dispo is floor  GCS 15  Non-op at this time

## 2021-07-30 NOTE — CONSULT NOTE ADULT - ATTENDING COMMENTS
I have seen this patient with the fellow and agree with their assessment and plan. I was physically present for significant portions of the evaluation and management (E/M) service provided.  I agree with the above history, physical, and plan which I have reviewed and edited where appropriate.    Hyponatremia in setting of likely inapp ADH due to CNS lesion  In addition, at home was drinking a lot of water  Na now is 130 after 2% started from 126  Goal tomorrow to be around 135, continue 2% for now at 30cc and then can change to Na tabs and FWR tomorrow if at range  Can get to 140 by Sunday  Checking urine studies( sent)  Cards to get us baseline Na values from outpatient    Yang Hernandez MD  Cell   Pager   Office     d/w with Maria Guadalupe POLLOCK I have seen this patient with the fellow and agree with their assessment and plan. I was physically present for significant portions of the evaluation and management (E/M) service provided.  I agree with the above history, physical, and plan which I have reviewed and edited where appropriate.    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

## 2021-07-30 NOTE — CONSULT NOTE ADULT - REASON FOR ADMISSION
Multiple falls/syncopal episodes --> IVH/SAH/nondisplaced left occipital bone fracture Multiple syncopal episodes --> fall --> IVH/SAH/nondisplaced left occipital bone fracture

## 2021-07-30 NOTE — CONSULT NOTE ADULT - ASSESSMENT
76-year-old female with a past medical history of hypertension, hyperlipidemia, mild intermittent asthma, osteoarthritis of the knees, who presents with multiple episodes of falls/syncopal episodes resulting in a nondisplaced fracture through the left occipital bone, hemorrhagic parenchymal contusions in the medial posterior left cerebellum as well as the inferior frontal lobes right greater than left, a thin subdural hemorrhage along the falx and right parietal temporal region, and small amount of hemorrhaging in 4th ventricle without midline shift 76-year-old female with a past medical history of hypertension, hyperlipidemia, mild intermittent asthma, osteoarthritis of the knees, who presents with multiple episodes of syncopal episodes resulting in a fall, eventually found to have a nondisplaced fracture through the left occipital bone, hemorrhagic parenchymal contusions in the medial posterior left cerebellum as well as the inferior frontal lobes right greater than left, a thin subdural hemorrhage along the falx and right parietal temporal region, a small amount of hemorrhaging in 4th ventricle without midline shift, and hyponatremia (126).         76-year-old female with a past medical history of hypertension, hyperlipidemia, mild intermittent asthma, osteoarthritis of the knees, who presents with multiple episodes of syncopal episodes resulting in a fall, eventually found to have a nondisplaced fracture through the left occipital bone, hemorrhagic parenchymal contusions in the medial posterior left cerebellum as well as the inferior frontal lobes right greater than left, a thin subdural hemorrhage along the falx and right parietal temporal region, a small amount of hemorrhaging in 4th ventricle without midline shift, and hyponatremia (126).    Intraparenchymal/subdural/intraventricular hemorrhage  Neuro checks q4h  If acute change in neurological exam, STAT CT head  CT venogram w/IV contrast -->  On keppra for seizure prophylaxis    Syncope  Recent outpt workup with her cardiologist/neurologist has been unremarkable  Cont telemetry monitoring to observe for bradycardia, pauses, tachyarrhythmias  Check orthostatics given dyazide usage @ home     Hyponatremia  Likely 2' SIADH in setting of IPH along with excessive water intake  Started 2% NS IVF in ER   Do not overcorrect by more than 10 meq per 24 hours  BMP q8h  1L fluid restriction daily  If pt overcorrects on subsequent BMP, please notify nephrology fellow on call    Gastrointestinal hemorrhage by history      Hypertension  Cont atenolol    Hyperlipidemia    Mild intermitttent asthma  Cont Proventil HFA q6h  Cont singulair    Osteoarthritis b/l knees  Not acute inpt issue  Tylenol prn    Need for DVT prophylaxis           76-year-old female with a past medical history of hypertension, hyperlipidemia, mild intermittent asthma, osteoarthritis of the knees, who presents with multiple episodes of syncopal episodes resulting in a fall, eventually found to have a nondisplaced fracture through the left occipital bone, hemorrhagic parenchymal contusions in the medial posterior left cerebellum as well as the inferior frontal lobes right greater than left, a thin subdural hemorrhage along the falx and right parietal temporal region, a small amount of hemorrhaging in 4th ventricle without midline shift, and hyponatremia (126).    Intraparenchymal/subdural/intraventricular hemorrhage  Neuro checks q4h  If acute change in neurological exam, STAT CT head  CT venogram w/IV contrast -->  On keppra for seizure prophylaxis    Syncope  Recent outpt workup with her cardiologist/neurologist has been unremarkable  Cont telemetry monitoring to observe for bradycardia, pauses, tachyarrhythmias  Check orthostatics given dyazide usage @ home   Of note, her admission EKG did show some slight ST depression V3-V5 that were not present on her most recent office EKGs  Low suspicion for coronary ischemia playing a role in her presentation, but if her TTE is unchanged, would not pursue ischemic workup unless she evinces signs of high-degree conduction disorder or sick sinus disorder on tele  Appreciate cardiology     Hyponatremia  Likely 2' SIADH in setting of IPH along with excessive water intake  Hold dyazide for now  Started 2% NS IVF in ER   Do not overcorrect by more than 10 meq per 24 hours  BMP q8h  1L fluid restriction daily  If pt overcorrects on subsequent BMP, please notify nephrology fellow on call  Appreciate nephrology    Gastrointestinal hemorrhage by history  No evidence for active bleed presently  Daily CBC  PPO PO daily    Hypertension  Cont atenolol    Hyperlipidemia  Cont atorvastatin    Mild intermitttent asthma  Cont proventil HFA q6h  Cont singulair    Osteoarthritis b/l knees  Not acute inpt issue  Tylenol prn    Need for DVT prophylaxis  SCD 2' IPH/SDH/IVH         76-year-old female with a past medical history of hypertension, hyperlipidemia, mild intermittent asthma, osteoarthritis of the knees, who presents with multiple episodes of syncopal episodes resulting in a fall, eventually found to have a nondisplaced fracture through the left occipital bone, hemorrhagic parenchymal contusions in the medial posterior left cerebellum as well as the inferior frontal lobes right greater than left, a thin subdural hemorrhage along the falx and right parietal temporal region, a small amount of hemorrhaging in 4th ventricle without midline shift, and hyponatremia (126).    Intraparenchymal/subdural/intraventricular hemorrhage  Neuro checks q4h  If acute change in neurological exam, STAT CT head  CT venogram w/IV contrast -->  LE venous dopplers -->  On keppra for seizure prophylaxis  Appreciate neurosurgery    Syncope  Recent outpt workup with her cardiologist/neurologist has been unremarkable  Cont telemetry monitoring to observe for bradycardia, pauses, tachyarrhythmias  Check orthostatics given dyazide usage @ home   Of note, her admission EKG did show some slight ST depression V3-V5 that were not present on her most recent office EKGs  Low suspicion for coronary ischemia playing a role in her presentation, but if her TTE is unchanged, would not pursue ischemic workup unless she evinces signs of high-degree conduction disorder or sick sinus disorder on tele  Appreciate cardiology     Hyponatremia  Likely 2' SIADH in setting of IPH along with excessive water intake  Hold dyazide for now  Started 2% NS IVF in ER   Do not overcorrect by more than 10 meq per 24 hours  BMP q8h  1L fluid restriction daily  If pt overcorrects on subsequent BMP, please notify nephrology fellow on call  Appreciate nephrology    Gastrointestinal hemorrhage by history  No evidence for active bleed presently  Daily CBC  PPO PO daily    Hypertension  Cont atenolol    Hyperlipidemia  Cont atorvastatin    Mild intermitttent asthma  Cont proventil HFA q6h  Cont singulair    Osteoarthritis b/l knees  Not acute inpt issue  Tylenol prn    Need for DVT prophylaxis  SCD 2' IPH/SDH/IVH

## 2021-07-30 NOTE — CONSULT NOTE ADULT - SUBJECTIVE AND OBJECTIVE BOX
CHIEF COMPLAINT:    HISTORY OF PRESENT ILLNESS:  77yo F h.o HTN/HLD, DM II, Hiatal hernia Venous insufficiency, Asthma, OA b/l knees presents to ED after multiple syncope today with fall. Pt currently AAOx3 however complaining of confusion and headache, cannot remember clearly the events surrounding falls. Accompanied by  who reports patient was found on the ground earlier today when he arrived home, he helped her to the rest room where she subsequently had several episodes of vomiting dark blood    Denies any prodromal symptoms but appears to be confused as well.   She sees Dr. Devin Arauz at [facility] for [facility]. Apparently though she has no known cardiac issues.   No previous syncopal episodes.     PAST MEDICAL & SURGICAL HISTORY:          MEDICATIONS:  ATENolol  Tablet 25 milliGRAM(s) Oral daily      ALBUTerol  90 MICROgram(s) HFA Inhaler - Peds 1 Puff(s) Inhalation every 6 hours  montelukast 10 milliGRAM(s) Oral daily PRN    acetaminophen   Tablet .. 650 milliGRAM(s) Oral every 6 hours PRN  ALPRAZolam 0.25 milliGRAM(s) Oral three times a day PRN  levETIRAcetam 500 milliGRAM(s) Oral two times a day  meclizine 12.5 milliGRAM(s) Oral Once PRN    bisacodyl 5 milliGRAM(s) Oral daily PRN  famotidine    Tablet 20 milliGRAM(s) Oral two times a day PRN  pantoprazole    Tablet 40 milliGRAM(s) Oral before breakfast  senna 2 Tablet(s) Oral at bedtime PRN    atorvastatin 40 milliGRAM(s) Oral once  insulin lispro (ADMELOG) corrective regimen sliding scale   SubCutaneous three times a day before meals  insulin lispro (ADMELOG) corrective regimen sliding scale   SubCutaneous at bedtime    fluticasone propionate 50 MICROgram(s)/spray Nasal Spray 1 Spray(s) Both Nostrils <User Schedule>  sodium chloride 2% . 1000 milliLiter(s) IV Continuous <Continuous>      FAMILY HISTORY:      SOCIAL HISTORY:    [ ] Non-smoker  [ ] Smoker  [ ] Alcohol    Allergies    No Known Allergies    Intolerances    	    REVIEW OF SYSTEMS:  CONSTITUTIONAL: No fever, weight loss, or fatigue  EYES: No eye pain, visual disturbances, or discharge  ENMT:  No difficulty hearing, tinnitus, vertigo; No sinus or throat pain  NECK: No pain or stiffness  RESPIRATORY: No cough, wheezing, chills or hemoptysis; No Shortness of Breath  CARDIOVASCULAR: No chest pain, palpitations, passing out, dizziness, or leg swelling  GASTROINTESTINAL: No abdominal or epigastric pain. No nausea, vomiting, or hematemesis; No diarrhea or constipation. No melena or hematochezia.  GENITOURINARY: No dysuria, frequency, hematuria, or incontinence  NEUROLOGICAL: No headaches, memory loss, loss of strength, numbness, or tremors  SKIN: No itching, burning, rashes, or lesions   LYMPH Nodes: No enlarged glands  ENDOCRINE: No heat or cold intolerance; No hair loss  MUSCULOSKELETAL: + joint pain or swelling; No muscle, back, or extremity pain  PSYCHIATRIC: No depression, anxiety, mood swings, or difficulty sleeping  HEME/LYMPH: No easy bruising, or bleeding gums  ALLERY AND IMMUNOLOGIC: No hives or eczema	    [ ] All others negative	  [ ] Unable to obtain    PHYSICAL EXAM:  T(C): 37.4 (07-30-21 @ 12:30), Max: 37.4 (07-30-21 @ 12:30)  HR: 61 (07-30-21 @ 12:30) (61 - 78)  BP: 122/60 (07-30-21 @ 12:30) (116/56 - 172/75)  RR: 18 (07-30-21 @ 12:30) (17 - 20)  SpO2: 99% (07-30-21 @ 12:30) (95% - 99%)  Wt(kg): --  I&O's Summary      Appearance: NAD L minda-orbital ecchymosis   HEENT:   Normal oral mucosa, PERRL, EOMI	  Lymphatic: No lymphadenopathy  Cardiovascular: Normal S1 S2, No JVD, No murmurs, No edema  Respiratory: Lungs clear to auscultation	  Psychiatry: A & O x 2 confused  Gastrointestinal:  Soft, Non-tender, + BS	  Skin: No rashes, No ecchymoses, No cyanosis	  Neurologic: Non-focal  Extremities: Normal range of motion, Right elbow and Right knee bruising   Vascular: Peripheral pulses palpable 2+ bilaterally    TELEMETRY: 	 SR    ECG:  	  RADIOLOGY: < from: CT Head No Cont (07.30.21 @ 04:54) >    EXAM:  CT BRAIN                            PROCEDURE DATE:  07/30/2021            INTERPRETATION:  CLINICAL INFORMATION: Altered mental status, reevaluate intraparenchymal hemorrhage.    TECHNIQUE: Sequential axial images were obtained from the vertex to the skull base without intravenous contrast. Coronal and sagittal reformations were obtained.    COMPARISON: CT head from 7/29/2021.    FINDINGS:    There is redemonstration of a nondisplaced fracture through the left occipital bone extending superiorly to the midline. Unchanged hemorrhagic parenchymal contusions are seen in the medial posterior left cerebellum (2-8), with surrounding edema as well as the inferior frontal lobes right greater than left, again measuring up to 1.7 x 3.0 cm (2-13).    Scattered subarachnoid hemorrhage along the frontal convexities, in the inferior left cerebellar tonsil and upper cervical canal are grossly unchanged.    The previously noted thin subdural hemorrhage along the falx and right parietal temporalconvexity are unchanged, again measuring up to 3 mm in depth.    The ventricles and sulci are normal in size. There is minimally decreased hemorrhage in the fourth ventricle compared to prior.    The calvarium is intact.    Near complete opacification of the right sphenoid sinus with forming secretions, unchanged. The remaining visualized portions of the paranasal sinuses and mastoid air cells are clear.    Status post bilateral cataract replacement.    IMPRESSION:  No interval change in the hemorrhagic parenchymal contusions, subdural and subarachnoid hemorrhages compared to CT head from 7/29/2021.    --- End of Report ---              GUSTAVO KNUTSON MD; Resident Radiology  This document has been electronically signed.  DAYSI JENNINGS MD; Attending Radiologist  This document has been electronically signed. Jul 30 2021  5:53AM    < end of copied text >  < from: CT Head No Cont (07.29.21 @ 23:35) >    EXAM:  CT CERVICAL SPINE                          EXAM:  CT BRAIN                            PROCEDURE DATE:  07/29/2021            INTERPRETATION:  CLINICAL HISTORY: Trauma. Multiple falls. Altered mental status.    TECHNIQUE: A noncontrast head CT and a noncontrast cervical spine CT were performed. The head CT was performed with contiguous 5 mm transaxial images from the skull base to vertex. Images were reviewed in brain, stroke, subdural and bone windows.  The cervical spine CT was performed with transaxial thin section images from the occiput to the T2 level.  Coronal and sagittal reformatted images were created from the transaxial source data.  Images were reviewed in bone and soft tissue windows.    COMPARISON: None available.    FINDINGS:    Head CT:  There is a nondisplaced fracture through the left occipital bone extending superiorly to to the midline. Hemorrhagic parenchymal contusions are seen in the medial posterior left cerebellum measuring up to 4 mm (3-9) with mild surrounding edema. Additional larger hemorrhagic parenchymal contusions are seen along the inferior frontal lobes, right greater than left, measuring up to 1.7 x 3.0 cm on the right (AP by transverse) with mild surrounding vasogenic edema. There is subarachnoid extension of hemorrhage along the frontal convexities. Additional scattered subarachnoid hemorrhage in the posterior fossa seen along the left cerebellar tonsil inferiorly and within the upper cervical canal. Suspect trace subarachnoid scatteredin bilateral frontoparietal sulci, left temporal sulci and along the high anterior right frontal convexity (3-25). Thin amount of subdural hemorrhage is seen along the falx as well as along the right parietal temporal convexity is measuring up to 3 mm in maximal depth.    The ventricles, sulci and cisternal spaces are normal in size and configuration. Small amount of hemorrhage is seen in the fourth ventricle. There is no midline shift.    There are no osteoblastic or lytic calvarial or skull base lesions. Near completely opacified right sphenoid sinus with foamy secretions. The remaining paranasal sinuses and mastoid air cells are clear.    Cervical spine CT:  There is straightening of the normal cervical lordosis. There are no acute fractures or evidence of traumatic malalignment. The vertebral body heights are maintained. Multilevel facet alignment is preserved. The atlanto-dental interval is within normal limits and the craniocervical junction is unremarkable. Bulky flowing anterior marginal osteophytes are present without acute fracture. There are no suspicious osteoblastic or lytic lesions.    There is no evidence of prevertebral soft tissue swelling or epidural hematoma.    Multilevel degenerative changes including intervertebral disc space narrowing, disc osteophyte complexes and facet arthropathy. Findings contribute to multilevel canal and foraminal stenosis, the degree of which is suboptimally assessed on this modality but appears most notable from C3/C4 through C5/C6 where it is least moderate canal stenosis.    The visualized soft tissues of the neck have an unremarkable unenhanced appearance. The lung apices are clear.    IMPRESSION:  Head CT: Nondisplaced left occipital fracture. Hemorrhagic parenchymal contusions medial left cerebellum and inferior bifrontal lobes with mild surrounding vasogenic edema. Thin acute subdural hemorrhages along the falx and right frontal temporal convexities. Scattered subarachnoid hemorrhage and tiny amount of fourth ventricle intraventricular hemorrhage. No midline shift.    A CTV of the brain can be considered given that the occipital fracture abuts the torcula and medial left transverse sinus and may predispose to dural venous sinus thrombosis.    Cervical spine CT: No acute cervical spine fracture or evidence of traumatic malalignment. Cervical spondylosis as above.    The head CT findings were discussed with Dr. Tello at 1:40 AM on 7/29/2021 with read back verification.    --- End of Report ---                DAYSI JENNINGS MD; Attending Radiologist  This document has been electronically signed. Jul 30 2021  1:53AM    < end of copied text >    OTHER: 	  	  LABS:	 	    CARDIAC MARKERS:                                  12.6   15.54 )-----------( 252      ( 29 Jul 2021 23:30 )             38.1     07-30    132<L>  |  94<L>  |  12  ----------------------------<  122<H>  3.3<L>   |  26  |  0.68    Ca    9.0      30 Jul 2021 12:09    TPro  6.9  /  Alb  4.2  /  TBili  0.5  /  DBili  x   /  AST  43<H>  /  ALT  16  /  AlkPhos  99  07-29    proBNP:   Lipid Profile:   HgA1c:   TSH:

## 2021-07-31 LAB
ANION GAP SERPL CALC-SCNC: 10 MMOL/L — SIGNIFICANT CHANGE UP (ref 5–17)
ANION GAP SERPL CALC-SCNC: 9 MMOL/L — SIGNIFICANT CHANGE UP (ref 5–17)
BUN SERPL-MCNC: 11 MG/DL — SIGNIFICANT CHANGE UP (ref 7–23)
BUN SERPL-MCNC: 14 MG/DL — SIGNIFICANT CHANGE UP (ref 7–23)
CALCIUM SERPL-MCNC: 8.9 MG/DL — SIGNIFICANT CHANGE UP (ref 8.4–10.5)
CALCIUM SERPL-MCNC: 9 MG/DL — SIGNIFICANT CHANGE UP (ref 8.4–10.5)
CHLORIDE SERPL-SCNC: 100 MMOL/L — SIGNIFICANT CHANGE UP (ref 96–108)
CHLORIDE SERPL-SCNC: 102 MMOL/L — SIGNIFICANT CHANGE UP (ref 96–108)
CO2 SERPL-SCNC: 22 MMOL/L — SIGNIFICANT CHANGE UP (ref 22–31)
CO2 SERPL-SCNC: 24 MMOL/L — SIGNIFICANT CHANGE UP (ref 22–31)
COVID-19 SPIKE DOMAIN AB INTERP: POSITIVE
COVID-19 SPIKE DOMAIN ANTIBODY RESULT: >250 U/ML — HIGH
CREAT SERPL-MCNC: 0.62 MG/DL — SIGNIFICANT CHANGE UP (ref 0.5–1.3)
CREAT SERPL-MCNC: 0.64 MG/DL — SIGNIFICANT CHANGE UP (ref 0.5–1.3)
GLUCOSE SERPL-MCNC: 102 MG/DL — HIGH (ref 70–99)
GLUCOSE SERPL-MCNC: 106 MG/DL — HIGH (ref 70–99)
HCT VFR BLD CALC: 32.8 % — LOW (ref 34.5–45)
HGB BLD-MCNC: 10.7 G/DL — LOW (ref 11.5–15.5)
MCHC RBC-ENTMCNC: 28.5 PG — SIGNIFICANT CHANGE UP (ref 27–34)
MCHC RBC-ENTMCNC: 32.6 GM/DL — SIGNIFICANT CHANGE UP (ref 32–36)
MCV RBC AUTO: 87.5 FL — SIGNIFICANT CHANGE UP (ref 80–100)
NRBC # BLD: 0 /100 WBCS — SIGNIFICANT CHANGE UP (ref 0–0)
PLATELET # BLD AUTO: 247 K/UL — SIGNIFICANT CHANGE UP (ref 150–400)
POTASSIUM SERPL-MCNC: 3.8 MMOL/L — SIGNIFICANT CHANGE UP (ref 3.5–5.3)
POTASSIUM SERPL-MCNC: 4.1 MMOL/L — SIGNIFICANT CHANGE UP (ref 3.5–5.3)
POTASSIUM SERPL-SCNC: 3.8 MMOL/L — SIGNIFICANT CHANGE UP (ref 3.5–5.3)
POTASSIUM SERPL-SCNC: 4.1 MMOL/L — SIGNIFICANT CHANGE UP (ref 3.5–5.3)
RBC # BLD: 3.75 M/UL — LOW (ref 3.8–5.2)
RBC # FLD: 12.5 % — SIGNIFICANT CHANGE UP (ref 10.3–14.5)
SARS-COV-2 IGG+IGM SERPL QL IA: >250 U/ML — HIGH
SARS-COV-2 IGG+IGM SERPL QL IA: POSITIVE
SODIUM SERPL-SCNC: 133 MMOL/L — LOW (ref 135–145)
SODIUM SERPL-SCNC: 134 MMOL/L — LOW (ref 135–145)
WBC # BLD: 10.66 K/UL — HIGH (ref 3.8–10.5)
WBC # FLD AUTO: 10.66 K/UL — HIGH (ref 3.8–10.5)

## 2021-07-31 PROCEDURE — 99233 SBSQ HOSP IP/OBS HIGH 50: CPT

## 2021-07-31 PROCEDURE — 93970 EXTREMITY STUDY: CPT | Mod: 26

## 2021-07-31 PROCEDURE — 99231 SBSQ HOSP IP/OBS SF/LOW 25: CPT | Mod: GC

## 2021-07-31 PROCEDURE — 70450 CT HEAD/BRAIN W/O DYE: CPT | Mod: 26

## 2021-07-31 RX ORDER — SENNA PLUS 8.6 MG/1
2 TABLET ORAL AT BEDTIME
Refills: 0 | Status: DISCONTINUED | OUTPATIENT
Start: 2021-07-31 | End: 2021-08-02

## 2021-07-31 RX ORDER — SODIUM CHLORIDE 9 MG/ML
2 INJECTION INTRAMUSCULAR; INTRAVENOUS; SUBCUTANEOUS EVERY 8 HOURS
Refills: 0 | Status: DISCONTINUED | OUTPATIENT
Start: 2021-07-31 | End: 2021-08-02

## 2021-07-31 RX ADMIN — Medication 50 MILLIEQUIVALENT(S): at 01:02

## 2021-07-31 RX ADMIN — PANTOPRAZOLE SODIUM 40 MILLIGRAM(S): 20 TABLET, DELAYED RELEASE ORAL at 08:48

## 2021-07-31 RX ADMIN — Medication 650 MILLIGRAM(S): at 13:30

## 2021-07-31 RX ADMIN — ALBUTEROL 1 PUFF(S): 90 AEROSOL, METERED ORAL at 18:35

## 2021-07-31 RX ADMIN — Medication 650 MILLIGRAM(S): at 05:36

## 2021-07-31 RX ADMIN — SODIUM CHLORIDE 2 GRAM(S): 9 INJECTION INTRAMUSCULAR; INTRAVENOUS; SUBCUTANEOUS at 15:04

## 2021-07-31 RX ADMIN — ALBUTEROL 1 PUFF(S): 90 AEROSOL, METERED ORAL at 12:59

## 2021-07-31 RX ADMIN — LEVETIRACETAM 500 MILLIGRAM(S): 250 TABLET, FILM COATED ORAL at 05:37

## 2021-07-31 RX ADMIN — SODIUM CHLORIDE 2 GRAM(S): 9 INJECTION INTRAMUSCULAR; INTRAVENOUS; SUBCUTANEOUS at 21:23

## 2021-07-31 RX ADMIN — Medication 1 SPRAY(S): at 12:59

## 2021-07-31 RX ADMIN — ATENOLOL 25 MILLIGRAM(S): 25 TABLET ORAL at 05:37

## 2021-07-31 RX ADMIN — Medication 650 MILLIGRAM(S): at 13:00

## 2021-07-31 RX ADMIN — LEVETIRACETAM 500 MILLIGRAM(S): 250 TABLET, FILM COATED ORAL at 18:35

## 2021-07-31 RX ADMIN — Medication 650 MILLIGRAM(S): at 06:06

## 2021-07-31 NOTE — PHYSICAL THERAPY INITIAL EVALUATION ADULT - LEVEL OF INDEPENDENCE: STAIR NEGOTIATION, REHAB EVAL
to be assessed: pt declined stating she has no entry steps, spouse claims there are "2 small steps & that's it"

## 2021-07-31 NOTE — PHYSICAL THERAPY INITIAL EVALUATION ADULT - ADDITIONAL COMMENTS
Pt lives with her  in a private home, no entry steps (spouse states there are 2 entry steps), flight to basement (pt can stay on 1st floor). Prior to admission pt was independent with all functional mobility including community ambulation without a device & ADL's. Pt has tub/shower combo, no grab bars, fixed shower head, & standard toilet seat height. Pt is right hand dominant, drives, & wears eye glasses. Pt states "I've been getting therapy forever for my balance." Goal of therapy: go home.

## 2021-07-31 NOTE — PROGRESS NOTE ADULT - ASSESSMENT
Hyponatremia in setting of likely inapp ADH due to CNS lesion- based on urine lytes SIADH  In addition, at home was drinking a lot of water  Na now is 133 after 2% started from 126  Can dc 2% now and change to salt tabs 2gm TID for now and monitor with FWR  Can give ensure and encourage diet intake  Cont FWR 1L   Can get to 140 by Sunday    Yang Hernandez MD  Cell   Pager   Office     d/w with Maria Guadalupe PA on call

## 2021-07-31 NOTE — PHYSICAL THERAPY INITIAL EVALUATION ADULT - GAIT DEVIATIONS NOTED, PT EVAL
loss of balance x 2 (unable to self correct)/decreased reva/decreased step length/decreased stride length/decreased weight-shifting ability

## 2021-07-31 NOTE — PHYSICAL THERAPY INITIAL EVALUATION ADULT - PLANNED THERAPY INTERVENTIONS, PT EVAL
stairs: GOAL: pt will be able to independently ascend/descend 2 steps with use of rail via step to step pattern within 1 week/balance training/gait training/strengthening/transfer training

## 2021-07-31 NOTE — PROGRESS NOTE ADULT - ASSESSMENT
ASSESSMENT: 77yo F h.o HTN/HLD, DM II, Hiatal hernia, Venous insufficiency, Asthma, OA b/l knees presents to ED after ?syncopal fall at home - patient does not remember what happened but  at bedside states she was found on the ground earlier when he arrived home, he helped her to the rest room where she subsequently had several episodes of vomiting dark blood. CT head was done and revealed a nondisplaced left occipital fracture, hemorrhagic parenchymal contusions in the medial left cerebellum and inferior bifrontal lobes with mild surrounding vasogenic edema, thin acute subdural hemorrhages along the falx and right frontal temporal convexities, scattered traumatic subarachnoid hemorrhage and tiny amount of fourth ventricle intraventricular hemorrhage. No midline shift.    PLAN:     NEURO:  - Neuro checks every 4 hours  - Repeat CT head tonight or sooner if neurologic change; so far stable traumatic SAH, hemorrhagic contusions and nondisplaced left occipital skull fracture  - Keppra 500 BID for seizure prophylaxis  - Tylenol PRN for pain control    PULM:  - Encouraged incentive spirometer  - Tolerating room air  - Continue DuoNeb and Singulair for history of asthma    CV:  - TTE completed, as above, EKG for syncope w/up  - Tele monitoring  - Cardiology consulted   - Continue Atenolol 25mg daily for hx of HTN  - Continue lipitor for history of HLD    RENAL:  - Hyponatremia improved now will stop 2% as per renal and start salt tabs and trend BMP  - Nephrology follow up appreciated  - Strict I/O    GI:  - Tolerates regular CCD , ensure supplementation as patient with decreased appetite  - GI prophylaxis [] not indicated [x] PPI - hx of hiatal hernia [] other: takes pepcid PRN for GERD  - Bowel regimen [] colace [x] senna [] other:    ENDO:   - Goal euglycemia (-180)  - Start low dose HISS and trend FS TID AC    HEME/ONC:  - VTE prophylaxis: [x] SCDs [] chemoprophylaxis [x] hold chemoprophylaxis due to: fresh hemorrhage [] high risk of DVT/PE on admission due to:    f/u am labs    DISPO:  - PT/OT-home PT and rolling walker upon d/c  - Will discuss with Dr. Kaminski  - Spectralink # [id number]

## 2021-07-31 NOTE — PHYSICAL THERAPY INITIAL EVALUATION ADULT - BALANCE TRAINING, PT EVAL
GOAL: pt will be able to independently ambulate 300ft without device & without loss of balance within 1 week

## 2021-07-31 NOTE — PHYSICAL THERAPY INITIAL EVALUATION ADULT - PERTINENT HX OF CURRENT PROBLEM, REHAB EVAL
Pt is a 77yo F admitted 2/2 multiple syncopal episodes resulting in falls. Work-up revealed traumatic SDH, traumatic SAH, & skull fx.

## 2021-08-01 LAB
ANION GAP SERPL CALC-SCNC: 12 MMOL/L — SIGNIFICANT CHANGE UP (ref 5–17)
ANION GAP SERPL CALC-SCNC: 14 MMOL/L — SIGNIFICANT CHANGE UP (ref 5–17)
BASOPHILS # BLD AUTO: 0.03 K/UL — SIGNIFICANT CHANGE UP (ref 0–0.2)
BASOPHILS NFR BLD AUTO: 0.3 % — SIGNIFICANT CHANGE UP (ref 0–2)
BUN SERPL-MCNC: 14 MG/DL — SIGNIFICANT CHANGE UP (ref 7–23)
BUN SERPL-MCNC: 15 MG/DL — SIGNIFICANT CHANGE UP (ref 7–23)
CALCIUM SERPL-MCNC: 9.1 MG/DL — SIGNIFICANT CHANGE UP (ref 8.4–10.5)
CALCIUM SERPL-MCNC: 9.3 MG/DL — SIGNIFICANT CHANGE UP (ref 8.4–10.5)
CHLORIDE SERPL-SCNC: 100 MMOL/L — SIGNIFICANT CHANGE UP (ref 96–108)
CHLORIDE SERPL-SCNC: 98 MMOL/L — SIGNIFICANT CHANGE UP (ref 96–108)
CO2 SERPL-SCNC: 22 MMOL/L — SIGNIFICANT CHANGE UP (ref 22–31)
CO2 SERPL-SCNC: 25 MMOL/L — SIGNIFICANT CHANGE UP (ref 22–31)
CREAT SERPL-MCNC: 0.62 MG/DL — SIGNIFICANT CHANGE UP (ref 0.5–1.3)
CREAT SERPL-MCNC: 0.63 MG/DL — SIGNIFICANT CHANGE UP (ref 0.5–1.3)
CULTURE RESULTS: SIGNIFICANT CHANGE UP
CULTURE RESULTS: SIGNIFICANT CHANGE UP
EOSINOPHIL # BLD AUTO: 0.03 K/UL — SIGNIFICANT CHANGE UP (ref 0–0.5)
EOSINOPHIL NFR BLD AUTO: 0.3 % — SIGNIFICANT CHANGE UP (ref 0–6)
GLUCOSE SERPL-MCNC: 91 MG/DL — SIGNIFICANT CHANGE UP (ref 70–99)
GLUCOSE SERPL-MCNC: 98 MG/DL — SIGNIFICANT CHANGE UP (ref 70–99)
HCT VFR BLD CALC: 34.3 % — LOW (ref 34.5–45)
HGB BLD-MCNC: 11.1 G/DL — LOW (ref 11.5–15.5)
IMM GRANULOCYTES NFR BLD AUTO: 0.7 % — SIGNIFICANT CHANGE UP (ref 0–1.5)
LYMPHOCYTES # BLD AUTO: 1.17 K/UL — SIGNIFICANT CHANGE UP (ref 1–3.3)
LYMPHOCYTES # BLD AUTO: 13 % — SIGNIFICANT CHANGE UP (ref 13–44)
MCHC RBC-ENTMCNC: 29.1 PG — SIGNIFICANT CHANGE UP (ref 27–34)
MCHC RBC-ENTMCNC: 32.4 GM/DL — SIGNIFICANT CHANGE UP (ref 32–36)
MCV RBC AUTO: 90 FL — SIGNIFICANT CHANGE UP (ref 80–100)
MONOCYTES # BLD AUTO: 0.73 K/UL — SIGNIFICANT CHANGE UP (ref 0–0.9)
MONOCYTES NFR BLD AUTO: 8.1 % — SIGNIFICANT CHANGE UP (ref 2–14)
NEUTROPHILS # BLD AUTO: 6.96 K/UL — SIGNIFICANT CHANGE UP (ref 1.8–7.4)
NEUTROPHILS NFR BLD AUTO: 77.6 % — HIGH (ref 43–77)
NRBC # BLD: 0 /100 WBCS — SIGNIFICANT CHANGE UP (ref 0–0)
PLATELET # BLD AUTO: 244 K/UL — SIGNIFICANT CHANGE UP (ref 150–400)
POTASSIUM SERPL-MCNC: 3.1 MMOL/L — LOW (ref 3.5–5.3)
POTASSIUM SERPL-MCNC: 3.5 MMOL/L — SIGNIFICANT CHANGE UP (ref 3.5–5.3)
POTASSIUM SERPL-SCNC: 3.1 MMOL/L — LOW (ref 3.5–5.3)
POTASSIUM SERPL-SCNC: 3.5 MMOL/L — SIGNIFICANT CHANGE UP (ref 3.5–5.3)
RBC # BLD: 3.81 M/UL — SIGNIFICANT CHANGE UP (ref 3.8–5.2)
RBC # FLD: 12.6 % — SIGNIFICANT CHANGE UP (ref 10.3–14.5)
SODIUM SERPL-SCNC: 134 MMOL/L — LOW (ref 135–145)
SODIUM SERPL-SCNC: 137 MMOL/L — SIGNIFICANT CHANGE UP (ref 135–145)
SPECIMEN SOURCE: SIGNIFICANT CHANGE UP
SPECIMEN SOURCE: SIGNIFICANT CHANGE UP
WBC # BLD: 8.98 K/UL — SIGNIFICANT CHANGE UP (ref 3.8–10.5)
WBC # FLD AUTO: 8.98 K/UL — SIGNIFICANT CHANGE UP (ref 3.8–10.5)

## 2021-08-01 PROCEDURE — 99232 SBSQ HOSP IP/OBS MODERATE 35: CPT

## 2021-08-01 PROCEDURE — 99232 SBSQ HOSP IP/OBS MODERATE 35: CPT | Mod: GC

## 2021-08-01 RX ADMIN — SODIUM CHLORIDE 2 GRAM(S): 9 INJECTION INTRAMUSCULAR; INTRAVENOUS; SUBCUTANEOUS at 05:39

## 2021-08-01 RX ADMIN — LEVETIRACETAM 500 MILLIGRAM(S): 250 TABLET, FILM COATED ORAL at 05:37

## 2021-08-01 RX ADMIN — PANTOPRAZOLE SODIUM 40 MILLIGRAM(S): 20 TABLET, DELAYED RELEASE ORAL at 07:24

## 2021-08-01 RX ADMIN — Medication 650 MILLIGRAM(S): at 10:19

## 2021-08-01 RX ADMIN — Medication 650 MILLIGRAM(S): at 12:05

## 2021-08-01 RX ADMIN — Medication 650 MILLIGRAM(S): at 21:10

## 2021-08-01 RX ADMIN — LEVETIRACETAM 500 MILLIGRAM(S): 250 TABLET, FILM COATED ORAL at 17:24

## 2021-08-01 RX ADMIN — SODIUM CHLORIDE 2 GRAM(S): 9 INJECTION INTRAMUSCULAR; INTRAVENOUS; SUBCUTANEOUS at 21:10

## 2021-08-01 RX ADMIN — SODIUM CHLORIDE 2 GRAM(S): 9 INJECTION INTRAMUSCULAR; INTRAVENOUS; SUBCUTANEOUS at 14:43

## 2021-08-01 RX ADMIN — Medication 650 MILLIGRAM(S): at 21:40

## 2021-08-01 RX ADMIN — SENNA PLUS 2 TABLET(S): 8.6 TABLET ORAL at 21:09

## 2021-08-01 RX ADMIN — ATENOLOL 25 MILLIGRAM(S): 25 TABLET ORAL at 05:37

## 2021-08-01 NOTE — OCCUPATIONAL THERAPY INITIAL EVALUATION ADULT - PRECAUTIONS/LIMITATIONS, REHAB EVAL
77yo F h.o HTN/HLD, DM II, Hiatal hernia Venous insufficiency, Asthma, OA b/l knees presents to ED after multiple syncope today with fall. Pt currently AAOx3 however complaining of confusion and headache, cannot remember clearly the events surrounding falls. Accompanied by  who reports patient was found on the ground earlier today when he arrived home, he helped her to the rest room where she subsequently had several episodes of vomiting dark blood. CT head was done and revealed a nondisplaced left occipital fracture, hemorrhagic parenchymal contusions in the medial left cerebellum and inferior bifrontal lobes with mild surrounding vasogenic edema, thin acute subdural hemorrhages along the falx and right frontal temporal convexities, scattered traumatic subarachnoid hemorrhage and tiny amount of fourth ventricle intraventricular hemorrhage. No midline shift. Repeat CT head shows unchanged bilateral contusions, tiny SAH is stable./fall precautions

## 2021-08-01 NOTE — PROGRESS NOTE ADULT - ASSESSMENT
Hyponatremia in setting of likely inapp ADH due to CNS lesion- based on urine lytes SIADH  In addition, at home was drinking a lot of water  Na now is 137 from 133 from 126, had required 2% for short period of time  Can decrease Na tabs to 1gm TID and goal Na tomorrow in 140 range  Can give ensure and encourage diet intake  Cont FWR 1L   Once >140, can dc salt tabs and monitor daily    Yang Hernandez MD  Cell   Pager   Office

## 2021-08-01 NOTE — OCCUPATIONAL THERAPY INITIAL EVALUATION ADULT - ANTICIPATED DISCHARGE DISPOSITION, OT EVAL
Home with supervision for ALL functional tasks and [facility] OT services for Activities of Daily Living re-training/home w/ OT

## 2021-08-01 NOTE — OCCUPATIONAL THERAPY INITIAL EVALUATION ADULT - COGNITIVE, VISUAL PERCEPTUAL, OT EVAL
Pt will recall 3/3 words after 5 min within 2 weeks. Pt will display good safety awareness during all Activities of Daily Living within 2 weeks.

## 2021-08-01 NOTE — PROGRESS NOTE ADULT - ASSESSMENT
77yo F h.o HTN/HLD, DM II, Hiatal hernia Venous insufficiency, Asthma, OA b/l knees presents to ED after multiple syncope today with fall. Pt currently AAOx3 however complaining of confusion and headache, cannot remember clearly the events surrounding falls. Accompanied by  who reports patient was found on the ground earlier today when he arrived home, he helped her to the rest room where she subsequently had several episodes of vomiting dark blood. CT head was done and revealed a nondisplaced left occipital fracture, hemorrhagic parenchymal contusions in the medial left cerebellum and inferior bifrontal lobes with mild surrounding vasogenic edema, thin acute subdural hemorrhages along the falx and right frontal temporal convexities, scattered traumatic subarachnoid hemorrhage and tiny amount of fourth ventricle intraventricular hemorrhage. No midline shift. Repeat CT head shows unchanged bilateral contusions, tiny SAH is stable.        PLAN:  Neuro:   - repeat Ct head shows unchanged bilateral contusions, tiny SAH is stable  - neuro exam is stable  - hyponatremia- Na improving now 137  - continue keppra for seizure prophylaxis  Respiratory:   - hx of asthma- albuterol MDI q6, fluticasone nasal spray  CV:  - HTN- continue atenolol  - Cardiology following for  Syncope: unclear etiology   - Monitor on tele    - Check orthostatics when able   - Fall precautions.   - on statin   DVT ppx:   - venodynes  Renal:   - hyponatremia -Nephrology recs appreciated  - Na 137, off 2% Nacl, on salt tabs 2g q8  GI:    - poor appetite, better today  PT/OT:   - Home PT w/RW      Makstr # [id number] 77yo F h.o HTN/HLD, DM II, Hiatal hernia Venous insufficiency, Asthma, OA b/l knees presents to ED after multiple syncope today with fall. Pt currently AAOx3 however complaining of confusion and headache, cannot remember clearly the events surrounding falls. Accompanied by  who reports patient was found on the ground earlier today when he arrived home, he helped her to the rest room where she subsequently had several episodes of vomiting dark blood. CT head was done and revealed a nondisplaced left occipital fracture, hemorrhagic parenchymal contusions in the medial left cerebellum and inferior bifrontal lobes with mild surrounding vasogenic edema, thin acute subdural hemorrhages along the falx and right frontal temporal convexities, scattered traumatic subarachnoid hemorrhage and tiny amount of fourth ventricle intraventricular hemorrhage. No midline shift. Repeat CT head shows unchanged bilateral contusions, tiny SAH is stable.        PLAN:  Neuro:   - repeat Ct head shows unchanged bilateral contusions, tiny SAH is stable  - neuro exam is stable  - hyponatremia- Na improving now 137  - continue keppra for seizure prophylaxis  Respiratory:   - hx of asthma- albuterol MDI q6, fluticasone nasal spray  CV:  - HTN- continue atenolol  - Cardiology following for  Syncope: unclear etiology   - Monitor on tele    - Check orthostatics when able   - Fall precautions.   - on statin   DVT ppx:   - venodynes  Renal:   - hyponatremia -Nephrology recs appreciated  - Na 137, off 2% Nacl, on salt tabs 2g q8  GI:    - poor appetite, better today  PT/OT:   - Home PT w/RW  Of note patient states she follows up with her orthopedist monthly. She does not want orthopedics consulted for knee effusion seen on xray    [facility] # [id number]

## 2021-08-01 NOTE — PROGRESS NOTE ADULT - PROBLEM SELECTOR PROBLEM 3
Closed fracture of right side of occipital bone, unspecified occipital fracture type, initial encounter
Closed fracture of right side of occipital bone, unspecified occipital fracture type, initial encounter

## 2021-08-01 NOTE — PROGRESS NOTE ADULT - ASSESSMENT
76-year-old female with a past medical history of hypertension, hyperlipidemia, mild intermittent asthma, osteoarthritis of the knees, who presents with multiple episodes of syncopal episodes resulting in a fall, eventually found to have a nondisplaced fracture through the left occipital bone, hemorrhagic parenchymal contusions in the medial posterior left cerebellum as well as the inferior frontal lobes right greater than left, a thin subdural hemorrhage along the falx and right parietal temporal region, a small amount of hemorrhaging in 4th ventricle without midline shift, and hyponatremia (126).    Intraparenchymal/subdural/intraventricular hemorrhage  Neuro checks qshift  If acute change in neurological exam, STAT CT head  CT venogram w/IV contrast 7/30/21 --> no venous sinus thrombosis  LE venous dopplers 7/31/21 --> (-)  F/u head CT 7/31/21 --> demonstrated stability in the hemorrhagic areas  On keppra for seizure prophylaxis  Appreciate neurosurgery    Syncope  Recent outpt workup with her cardiologist/neurologist has been unremarkable  Telemetry monitoring so far (-) for bradycardia, pauses, tachyarrhythmias  Check orthostatics given dyazide usage @ home   Of note, her admission EKG did show some slight ST depression V3-V5 that were not present on her most recent office EKGs  Low suspicion for coronary ischemia playing a role in her presentation, but if her TTE is unchanged, would not pursue ischemic workup unless she evinces signs of high-degree conduction disorder or sick sinus disorder on tele  Appreciate cardiology   I will touch base with her outside cardiologist tonight to update him, if he is requesting any further inpatient workup, I will have the pt transferred to [facility] internal medicine service Monday morning.    Hyponatremia  Likely 2' SIADH in setting of IPH along with excessive water intake  Hold dyazide for now  S/p 2% NS IVF in ER   Daily BMP  1L fluid restriction daily  Resolved  Appreciate nephrology    Gastrointestinal hemorrhage by history  No evidence for active bleed presently  Daily CBC  PPO PO daily    Hypertension  Cont atenolol    Hyperlipidemia  Cont atorvastatin    Mild intermitttent asthma  Cont proventil HFA q6h  Cont singulair    Osteoarthritis b/l knees  Not acute inpt issue  Tylenol prn    Need for DVT prophylaxis  SCD 2' IPH/SDH/IVH

## 2021-08-02 ENCOUNTER — TRANSCRIPTION ENCOUNTER (OUTPATIENT)
Age: 76
End: 2021-08-02

## 2021-08-02 VITALS
OXYGEN SATURATION: 97 % | RESPIRATION RATE: 18 BRPM | DIASTOLIC BLOOD PRESSURE: 74 MMHG | HEART RATE: 65 BPM | TEMPERATURE: 98 F | SYSTOLIC BLOOD PRESSURE: 158 MMHG

## 2021-08-02 DIAGNOSIS — R55 SYNCOPE AND COLLAPSE: ICD-10-CM

## 2021-08-02 DIAGNOSIS — E87.6 HYPOKALEMIA: ICD-10-CM

## 2021-08-02 LAB
ANION GAP SERPL CALC-SCNC: 16 MMOL/L — SIGNIFICANT CHANGE UP (ref 5–17)
BUN SERPL-MCNC: 16 MG/DL — SIGNIFICANT CHANGE UP (ref 7–23)
CALCIUM SERPL-MCNC: 9 MG/DL — SIGNIFICANT CHANGE UP (ref 8.4–10.5)
CHLORIDE SERPL-SCNC: 97 MMOL/L — SIGNIFICANT CHANGE UP (ref 96–108)
CO2 SERPL-SCNC: 22 MMOL/L — SIGNIFICANT CHANGE UP (ref 22–31)
CREAT SERPL-MCNC: 0.59 MG/DL — SIGNIFICANT CHANGE UP (ref 0.5–1.3)
GLUCOSE SERPL-MCNC: 81 MG/DL — SIGNIFICANT CHANGE UP (ref 70–99)
POTASSIUM SERPL-MCNC: 2.9 MMOL/L — CRITICAL LOW (ref 3.5–5.3)
POTASSIUM SERPL-SCNC: 2.9 MMOL/L — CRITICAL LOW (ref 3.5–5.3)
SODIUM SERPL-SCNC: 135 MMOL/L — SIGNIFICANT CHANGE UP (ref 135–145)

## 2021-08-02 PROCEDURE — 72125 CT NECK SPINE W/O DYE: CPT

## 2021-08-02 PROCEDURE — 96375 TX/PRO/DX INJ NEW DRUG ADDON: CPT

## 2021-08-02 PROCEDURE — G1004: CPT

## 2021-08-02 PROCEDURE — 99291 CRITICAL CARE FIRST HOUR: CPT | Mod: 25

## 2021-08-02 PROCEDURE — 83605 ASSAY OF LACTIC ACID: CPT

## 2021-08-02 PROCEDURE — 74176 CT ABD & PELVIS W/O CONTRAST: CPT

## 2021-08-02 PROCEDURE — 87086 URINE CULTURE/COLONY COUNT: CPT

## 2021-08-02 PROCEDURE — 97162 PT EVAL MOD COMPLEX 30 MIN: CPT

## 2021-08-02 PROCEDURE — 80053 COMPREHEN METABOLIC PANEL: CPT

## 2021-08-02 PROCEDURE — 73560 X-RAY EXAM OF KNEE 1 OR 2: CPT

## 2021-08-02 PROCEDURE — 80048 BASIC METABOLIC PNL TOTAL CA: CPT

## 2021-08-02 PROCEDURE — 84295 ASSAY OF SERUM SODIUM: CPT

## 2021-08-02 PROCEDURE — U0005: CPT

## 2021-08-02 PROCEDURE — U0003: CPT

## 2021-08-02 PROCEDURE — 70450 CT HEAD/BRAIN W/O DYE: CPT

## 2021-08-02 PROCEDURE — 84484 ASSAY OF TROPONIN QUANT: CPT

## 2021-08-02 PROCEDURE — 97166 OT EVAL MOD COMPLEX 45 MIN: CPT

## 2021-08-02 PROCEDURE — 82962 GLUCOSE BLOOD TEST: CPT

## 2021-08-02 PROCEDURE — 93306 TTE W/DOPPLER COMPLETE: CPT

## 2021-08-02 PROCEDURE — 82947 ASSAY GLUCOSE BLOOD QUANT: CPT

## 2021-08-02 PROCEDURE — 85025 COMPLETE CBC W/AUTO DIFF WBC: CPT

## 2021-08-02 PROCEDURE — 84133 ASSAY OF URINE POTASSIUM: CPT

## 2021-08-02 PROCEDURE — 82330 ASSAY OF CALCIUM: CPT

## 2021-08-02 PROCEDURE — 82803 BLOOD GASES ANY COMBINATION: CPT

## 2021-08-02 PROCEDURE — 99232 SBSQ HOSP IP/OBS MODERATE 35: CPT | Mod: GC

## 2021-08-02 PROCEDURE — 85027 COMPLETE CBC AUTOMATED: CPT

## 2021-08-02 PROCEDURE — 85610 PROTHROMBIN TIME: CPT

## 2021-08-02 PROCEDURE — 83930 ASSAY OF BLOOD OSMOLALITY: CPT

## 2021-08-02 PROCEDURE — 84132 ASSAY OF SERUM POTASSIUM: CPT

## 2021-08-02 PROCEDURE — 85014 HEMATOCRIT: CPT

## 2021-08-02 PROCEDURE — 99239 HOSP IP/OBS DSCHRG MGMT >30: CPT

## 2021-08-02 PROCEDURE — 93005 ELECTROCARDIOGRAM TRACING: CPT

## 2021-08-02 PROCEDURE — 83690 ASSAY OF LIPASE: CPT

## 2021-08-02 PROCEDURE — 70496 CT ANGIOGRAPHY HEAD: CPT | Mod: MG

## 2021-08-02 PROCEDURE — 84300 ASSAY OF URINE SODIUM: CPT

## 2021-08-02 PROCEDURE — 93970 EXTREMITY STUDY: CPT

## 2021-08-02 PROCEDURE — 71045 X-RAY EXAM CHEST 1 VIEW: CPT

## 2021-08-02 PROCEDURE — 82435 ASSAY OF BLOOD CHLORIDE: CPT

## 2021-08-02 PROCEDURE — 81001 URINALYSIS AUTO W/SCOPE: CPT

## 2021-08-02 PROCEDURE — 76376 3D RENDER W/INTRP POSTPROCES: CPT

## 2021-08-02 PROCEDURE — 85018 HEMOGLOBIN: CPT

## 2021-08-02 PROCEDURE — 94640 AIRWAY INHALATION TREATMENT: CPT

## 2021-08-02 PROCEDURE — 82436 ASSAY OF URINE CHLORIDE: CPT

## 2021-08-02 PROCEDURE — 82570 ASSAY OF URINE CREATININE: CPT

## 2021-08-02 PROCEDURE — 85730 THROMBOPLASTIN TIME PARTIAL: CPT

## 2021-08-02 PROCEDURE — 86769 SARS-COV-2 COVID-19 ANTIBODY: CPT

## 2021-08-02 PROCEDURE — 83935 ASSAY OF URINE OSMOLALITY: CPT

## 2021-08-02 PROCEDURE — 71250 CT THORAX DX C-: CPT

## 2021-08-02 PROCEDURE — 96374 THER/PROPH/DIAG INJ IV PUSH: CPT | Mod: XU

## 2021-08-02 RX ORDER — LEVETIRACETAM 250 MG/1
1 TABLET, FILM COATED ORAL
Qty: 14 | Refills: 0
Start: 2021-08-02 | End: 2021-08-08

## 2021-08-02 RX ORDER — SODIUM CHLORIDE 9 MG/ML
1 INJECTION INTRAMUSCULAR; INTRAVENOUS; SUBCUTANEOUS
Qty: 10 | Refills: 0
Start: 2021-08-02 | End: 2021-08-06

## 2021-08-02 RX ORDER — TRIAMTERENE/HYDROCHLOROTHIAZID 75 MG-50MG
1 TABLET ORAL
Qty: 0 | Refills: 0 | DISCHARGE

## 2021-08-02 RX ORDER — ACETAMINOPHEN 500 MG
2 TABLET ORAL
Qty: 0 | Refills: 0 | DISCHARGE
Start: 2021-08-02

## 2021-08-02 RX ORDER — POTASSIUM CHLORIDE 20 MEQ
20 PACKET (EA) ORAL
Refills: 0 | Status: COMPLETED | OUTPATIENT
Start: 2021-08-02 | End: 2021-08-02

## 2021-08-02 RX ADMIN — PANTOPRAZOLE SODIUM 40 MILLIGRAM(S): 20 TABLET, DELAYED RELEASE ORAL at 07:43

## 2021-08-02 RX ADMIN — Medication 650 MILLIGRAM(S): at 08:12

## 2021-08-02 RX ADMIN — Medication 20 MILLIEQUIVALENT(S): at 10:25

## 2021-08-02 RX ADMIN — SODIUM CHLORIDE 2 GRAM(S): 9 INJECTION INTRAMUSCULAR; INTRAVENOUS; SUBCUTANEOUS at 05:11

## 2021-08-02 RX ADMIN — Medication 650 MILLIGRAM(S): at 07:43

## 2021-08-02 RX ADMIN — LEVETIRACETAM 500 MILLIGRAM(S): 250 TABLET, FILM COATED ORAL at 05:11

## 2021-08-02 RX ADMIN — ATENOLOL 25 MILLIGRAM(S): 25 TABLET ORAL at 05:11

## 2021-08-02 RX ADMIN — Medication 20 MILLIEQUIVALENT(S): at 08:15

## 2021-08-02 NOTE — CONSULT NOTE ADULT - ASSESSMENT
This is a 76y year old Female with the below past medical history who presents with the chief complaint of falls. PMHx of HTN/HLD, DM II, Hiatal hernia Venous insufficiency, Asthma, OA b/l knees presents to ED after multiple syncope on 7/30.   Pt found by  in bed, she had fallen in house, she was confused, vomiting blood, called EMS.  Pt found to have occipital fx and ICH, admitted to NS service.  Pt has had improvement in level of mentation over the few days since admission as per .  She is well known to my associate Dr. Reid for gait issues, has peripheral neuropathy, she has done gait and balance PT in our office for some time.  She denies new focal weakness, numbness, changes in speech, swallow, vision, bowel or bladder control.  While in house, issues with hyponatremia.      Unclear circumstance of fall, pt has had falls in the past  exam is nonfocal except unsteady gait    timing of follow up scans as per NS  if no sz after 7 days, will not need keppra for sz prophylaxis  Hyponatremia as per medicine  appreciate cards consult  can follow with my associate dr reid as outpt  d/w pt and  at bedside

## 2021-08-02 NOTE — PROGRESS NOTE ADULT - SUBJECTIVE AND OBJECTIVE BOX
[facility] DIVISION OF KIDNEY DISEASES AND HYPERTENSION -- FOLLOW UP NOTE  --------------------------------------------------------------------------------  Chief Complaint:    24 hour events/subjective:  Na improved      PAST HISTORY  --------------------------------------------------------------------------------  No significant changes to PMH, PSH, FHx, SHx, unless otherwise noted    ALLERGIES & MEDICATIONS  --------------------------------------------------------------------------------  Allergies    No Known Allergies    Intolerances      Standing Inpatient Medications  ALBUTerol  90 MICROgram(s) HFA Inhaler - Peds 1 Puff(s) Inhalation every 6 hours  ATENolol  Tablet 25 milliGRAM(s) Oral daily  atorvastatin 40 milliGRAM(s) Oral once  fluticasone propionate 50 MICROgram(s)/spray Nasal Spray 1 Spray(s) Both Nostrils <User Schedule>  insulin lispro (ADMELOG) corrective regimen sliding scale   SubCutaneous three times a day before meals  insulin lispro (ADMELOG) corrective regimen sliding scale   SubCutaneous at bedtime  levETIRAcetam 500 milliGRAM(s) Oral two times a day  pantoprazole    Tablet 40 milliGRAM(s) Oral before breakfast  senna 2 Tablet(s) Oral at bedtime  sodium chloride 2 Gram(s) Oral every 8 hours    PRN Inpatient Medications  acetaminophen   Tablet .. 650 milliGRAM(s) Oral every 6 hours PRN  ALPRAZolam 0.25 milliGRAM(s) Oral three times a day PRN  bisacodyl 5 milliGRAM(s) Oral daily PRN  famotidine    Tablet 20 milliGRAM(s) Oral two times a day PRN  meclizine 12.5 milliGRAM(s) Oral Once PRN  montelukast 10 milliGRAM(s) Oral daily PRN  ondansetron Injectable 4 milliGRAM(s) IV Push every 6 hours PRN      REVIEW OF SYSTEMS  --------------------------------------------------------------------------------  Gen: No weight changes, fatigue, fevers/chills, weakness  Skin: No rashes  Head/Eyes/Ears/Mouth: No headache; Normal hearing; Normal vision w/o blurriness; No sinus pain/discomfort, sore throat  Respiratory: No dyspnea, cough, wheezing, hemoptysis  CV: No chest pain, PND, orthopnea  GI: No abdominal pain, diarrhea, constipation, nausea, vomiting, melena, hematochezia  : No increased frequency, dysuria, hematuria, nocturia  MSK: No joint pain/swelling; no back pain; no edema  Neuro: No dizziness/lightheadedness, weakness, seizures, numbness, tingling  Heme: No easy bruising or bleeding  Endo: No heat/cold intolerance  Psych: No significant nervousness, anxiety, stress, depression    All other systems were reviewed and are negative, except as noted.    VITALS/PHYSICAL EXAM  --------------------------------------------------------------------------------  T(C): 36.7 (07-31-21 @ 08:48), Max: 37.4 (07-30-21 @ 12:30)  HR: 72 (07-31-21 @ 08:48) (55 - 72)  BP: 150/84 (07-31-21 @ 08:48) (122/60 - 155/74)  RR: 18 (07-31-21 @ 08:48) (18 - 18)  SpO2: 98% (07-31-21 @ 08:48) (96% - 99%)  Wt(kg): --  Height (cm): 160 (07-29-21 @ 22:42)  Weight (kg): 61.2 (07-29-21 @ 22:42)  BMI (kg/m2): 23.9 (07-29-21 @ 22:42)  BSA (m2): 1.64 (07-29-21 @ 22:42)      07-30-21 @ 07:01  -  07-31-21 @ 07:00  --------------------------------------------------------  IN: 450 mL / OUT: 1650 mL / NET: -1200 mL    07-31-21 @ 07:01  -  07-31-21 @ 10:44  --------------------------------------------------------  IN: 270 mL / OUT: 0 mL / NET: 270 mL      PHYSICAL EXAM: vital signs as above  in no apparent distress  Neck: Supple, no JVD,    Lungs: no rhonchi, no wheeze, no crackles  CVS: S1 S2 no M/R/G  Abdomen: no tenderness, no organomegaly, BS present  Skin: warm, dry      LABS/STUDIES  --------------------------------------------------------------------------------              10.7   10.66 >-----------<  247      [07-31-21 @ 05:18]              32.8     133  |  100  |  11  ----------------------------<  106      [07-31-21 @ 05:18]  4.1   |  24  |  0.64        Ca     8.9     [07-31-21 @ 05:18]    TPro  6.9  /  Alb  4.2  /  TBili  0.5  /  DBili  x   /  AST  43  /  ALT  16  /  AlkPhos  99  [07-29-21 @ 23:30]    PT/INR: PT 11.5 , INR 0.96       [07-29-21 @ 23:30]  PTT: 27.2       [07-29-21 @ 23:30]    Serum Osmolality 264      [07-30-21 @ 02:13]    Creatinine Trend:  SCr 0.64 [07-31 @ 05:18]  SCr 0.62 [07-30 @ 20:18]  SCr 0.68 [07-30 @ 12:09]  SCr 0.68 [07-30 @ 04:33]  SCr 0.64 [07-29 @ 23:30]    Urinalysis - [07-30-21 @ 04:26]      Color Light Yellow / Appearance Slightly Turbid / SG 1.016 / pH 6.5      Gluc Negative / Ketone Negative  / Bili Negative / Urobili Negative       Blood Negative / Protein Negative / Leuk Est Large / Nitrite Negative      RBC 5 / WBC  / Hyaline  / Gran  / Sq Epi  / Non Sq Epi  / Bacteria Few    Urine Creatinine 81      [07-30-21 @ 16:36]  Urine Sodium 69      [07-30-21 @ 16:36]  Urine Potassium 70      [07-30-21 @ 16:36]  Urine Chloride 74      [07-30-21 @ 16:36]  Urine Osmolality 544      [07-30-21 @ 16:35]        
Spoke with  at bedside  Pt herself denies any acute cardiac or pulmonary symptoms overnight  No pauses or tachyarrhythmic events on tele overnight    Vital Signs Last 24 Hrs  T(C): 36.6 (01 Aug 2021 11:02), Max: 37.8 (31 Jul 2021 19:58)  T(F): 97.8 (01 Aug 2021 11:02), Max: 100.1 (31 Jul 2021 19:58)  HR: 60 (01 Aug 2021 11:02) (60 - 81)  BP: 160/83 (01 Aug 2021 11:02) (128/77 - 167/76)  BP(mean): --  RR: 18 (01 Aug 2021 11:02) (18 - 18)  SpO2: 97% (01 Aug 2021 11:02) (95% - 98%)    I&O's Summary    07-31-21 @ 07:01  -  08-01-21 @ 07:00  --------------------------------------------------------  IN: 830 mL / OUT: 910 mL / NET: -80 mL        GENERAL: NAD  HEAD:  Ecchymosis left eye, Normocephalic  EYES: EOMI, PERRLA, conjunctiva and sclera clear  NECK: Supple, No JVD  CHEST/LUNG: Clear to auscultation bilaterally; Normal respiratory effort w/o intercostal retractions  HEART: Regular rate and rhythm; nl S1/S2; No murmurs, rubs, or gallops  ABDOMEN: Soft, Nontender, Nondistended; Bowel sounds present  EXTREMITIES:  2+ Peripheral Pulses; No clubbing, cyanosis, or edema b/l; Nl ROM  SKIN: Ecchymoses left knees and left elbow; No jaundice; Normal turgor, texture  NEURO: A+O x 3; nonfocal CN/motor/sensory/reflexes; speech + comprehension are intact  PSYCH: Nl affect; no delirium or agitation; no suicidal/homicidal ideation    LABS:                        11.1   8.98  )-----------( 244      ( 01 Aug 2021 06:31 )             34.3     08-01    137  |  100  |  14  ----------------------------<  98  3.5   |  25  |  0.63    Ca    9.1      01 Aug 2021 06:29        CAPILLARY BLOOD GLUCOSE      POCT Blood Glucose.: 96 mg/dL (01 Aug 2021 10:59)  POCT Blood Glucose.: 93 mg/dL (01 Aug 2021 07:33)  POCT Blood Glucose.: 91 mg/dL (31 Jul 2021 22:04)  POCT Blood Glucose.: 105 mg/dL (31 Jul 2021 16:32)            RADIOLOGY & ADDITIONAL TESTS:    Imaging Personally Reviewed:  [x] YES  [ ] NO    Will obtain old records:  [ ] YES  [x] NO
[facility] DIVISION OF KIDNEY DISEASES AND HYPERTENSION -- FOLLOW UP NOTE  --------------------------------------------------------------------------------  Chief Complaint:    24 hour events/subjective:    Patient examined this morning   no acute events noted overnight   no acute complaints this morning  patient wants to go home    PAST HISTORY  --------------------------------------------------------------------------------  No significant changes to PMH, PSH, FHx, SHx, unless otherwise noted    ALLERGIES & MEDICATIONS  --------------------------------------------------------------------------------  Allergies    No Known Allergies    Intolerances      Standing Inpatient Medications  ALBUTerol  90 MICROgram(s) HFA Inhaler - Peds 1 Puff(s) Inhalation every 6 hours  ATENolol  Tablet 25 milliGRAM(s) Oral daily  atorvastatin 40 milliGRAM(s) Oral once  fluticasone propionate 50 MICROgram(s)/spray Nasal Spray 1 Spray(s) Both Nostrils <User Schedule>  insulin lispro (ADMELOG) corrective regimen sliding scale   SubCutaneous three times a day before meals  insulin lispro (ADMELOG) corrective regimen sliding scale   SubCutaneous at bedtime  levETIRAcetam 500 milliGRAM(s) Oral two times a day  pantoprazole    Tablet 40 milliGRAM(s) Oral before breakfast  senna 2 Tablet(s) Oral at bedtime  sodium chloride 2 Gram(s) Oral every 8 hours    PRN Inpatient Medications  acetaminophen   Tablet .. 650 milliGRAM(s) Oral every 6 hours PRN  ALPRAZolam 0.25 milliGRAM(s) Oral three times a day PRN  bisacodyl 5 milliGRAM(s) Oral daily PRN  famotidine    Tablet 20 milliGRAM(s) Oral two times a day PRN  meclizine 12.5 milliGRAM(s) Oral Once PRN  montelukast 10 milliGRAM(s) Oral daily PRN  ondansetron Injectable 4 milliGRAM(s) IV Push every 6 hours PRN      REVIEW OF SYSTEMS    All other systems were reviewed and are negative, except as noted.    VITALS/PHYSICAL EXAM  --------------------------------------------------------------------------------  T(C): 36.4 (08-02-21 @ 11:37), Max: 37.3 (08-01-21 @ 15:15)  HR: 65 (08-02-21 @ 11:37) (64 - 74)  BP: 158/74 (08-02-21 @ 11:37) (139/79 - 172/77)  RR: 18 (08-02-21 @ 11:37) (18 - 18)  SpO2: 97% (08-02-21 @ 11:37) (97% - 98%)  Wt(kg): --        08-02-21 @ 07:01  -  08-02-21 @ 12:10  --------------------------------------------------------  IN: 120 mL / OUT: 0 mL / NET: 120 mL        Physical Exam:  	Gen: NAD  	HEENT: L eye ecchymosis  	Pulm: CTA B/L  	CV: S1S2  	Abd: Soft, +BS   	Ext: No LE edema B/L  	Neuro: Awake  	Skin: Warm and dry  	Vascular access: N/A      LABS/STUDIES  --------------------------------------------------------------------------------              11.1   8.98  >-----------<  244      [08-01-21 @ 06:31]              34.3     135  |  97  |  16  ----------------------------<  81      [08-02-21 @ 07:02]  2.9   |  22  |  0.59        Ca     9.0     [08-02-21 @ 07:02]            Creatinine Trend:  SCr 0.59 [08-02 @ 07:02]  SCr 0.62 [08-01 @ 16:45]  SCr 0.63 [08-01 @ 06:29]  SCr 0.62 [07-31 @ 20:00]  SCr 0.64 [07-31 @ 05:18]    Urinalysis - [07-30-21 @ 04:26]      Color Light Yellow / Appearance Slightly Turbid / SG 1.016 / pH 6.5      Gluc Negative / Ketone Negative  / Bili Negative / Urobili Negative       Blood Negative / Protein Negative / Leuk Est Large / Nitrite Negative      RBC 5 / WBC  / Hyaline  / Gran  / Sq Epi  / Non Sq Epi  / Bacteria Few    Urine Creatinine 81      [07-30-21 @ 16:36]  Urine Sodium 69      [07-30-21 @ 16:36]  Urine Potassium 70      [07-30-21 @ 16:36]  Urine Chloride 74      [07-30-21 @ 16:36]  Urine Osmolality 544      [07-30-21 @ 16:35]        
[facility] DIVISION OF KIDNEY DISEASES AND HYPERTENSION -- FOLLOW UP NOTE  --------------------------------------------------------------------------------  Chief Complaint:    24 hour events/subjective:  improving Na      PAST HISTORY  --------------------------------------------------------------------------------  No significant changes to PMH, PSH, FHx, SHx, unless otherwise noted    ALLERGIES & MEDICATIONS  --------------------------------------------------------------------------------  Allergies    No Known Allergies    Intolerances      Standing Inpatient Medications  ALBUTerol  90 MICROgram(s) HFA Inhaler - Peds 1 Puff(s) Inhalation every 6 hours  ATENolol  Tablet 25 milliGRAM(s) Oral daily  atorvastatin 40 milliGRAM(s) Oral once  fluticasone propionate 50 MICROgram(s)/spray Nasal Spray 1 Spray(s) Both Nostrils <User Schedule>  insulin lispro (ADMELOG) corrective regimen sliding scale   SubCutaneous three times a day before meals  insulin lispro (ADMELOG) corrective regimen sliding scale   SubCutaneous at bedtime  levETIRAcetam 500 milliGRAM(s) Oral two times a day  pantoprazole    Tablet 40 milliGRAM(s) Oral before breakfast  senna 2 Tablet(s) Oral at bedtime  sodium chloride 2 Gram(s) Oral every 8 hours    PRN Inpatient Medications  acetaminophen   Tablet .. 650 milliGRAM(s) Oral every 6 hours PRN  ALPRAZolam 0.25 milliGRAM(s) Oral three times a day PRN  bisacodyl 5 milliGRAM(s) Oral daily PRN  famotidine    Tablet 20 milliGRAM(s) Oral two times a day PRN  meclizine 12.5 milliGRAM(s) Oral Once PRN  montelukast 10 milliGRAM(s) Oral daily PRN  ondansetron Injectable 4 milliGRAM(s) IV Push every 6 hours PRN      REVIEW OF SYSTEMS  --------------------------------------------------------------------------------  Gen: No weight changes, fatigue, fevers/chills, weakness  Skin: No rashes  Head/Eyes/Ears/Mouth: No headache; Normal hearing; Normal vision w/o blurriness; No sinus pain/discomfort, sore throat  Respiratory: No dyspnea, cough, wheezing, hemoptysis  CV: No chest pain, PND, orthopnea  GI: No abdominal pain, diarrhea, constipation, nausea, vomiting, melena, hematochezia  : No increased frequency, dysuria, hematuria, nocturia  MSK: No joint pain/swelling; no back pain; no edema  Neuro: No dizziness/lightheadedness, weakness, seizures, numbness, tingling  Heme: No easy bruising or bleeding  Endo: No heat/cold intolerance  Psych: No significant nervousness, anxiety, stress, depression    All other systems were reviewed and are negative, except as noted.    VITALS/PHYSICAL EXAM  --------------------------------------------------------------------------------  T(C): 37.2 (08-01-21 @ 07:35), Max: 37.8 (07-31-21 @ 19:58)  HR: 65 (08-01-21 @ 07:35) (60 - 81)  BP: 147/77 (08-01-21 @ 07:35) (128/77 - 167/76)  RR: 18 (08-01-21 @ 07:35) (18 - 18)  SpO2: 98% (08-01-21 @ 07:35) (95% - 98%)  Wt(kg): --        07-31-21 @ 07:01  -  08-01-21 @ 07:00  --------------------------------------------------------  IN: 830 mL / OUT: 910 mL / NET: -80 mL      PHYSICAL EXAM: vital signs as above  in no apparent distress  Neck: Supple, no JVD,    Lungs: no rhonchi, no wheeze, no crackles  CVS: S1 S2 no M/R/G  Abdomen: no tenderness, no organomegaly, BS present          LABS/STUDIES  --------------------------------------------------------------------------------              11.1   8.98  >-----------<  244      [08-01-21 @ 06:31]              34.3     137  |  100  |  14  ----------------------------<  98      [08-01-21 @ 06:29]  3.5   |  25  |  0.63        Ca     9.1     [08-01-21 @ 06:29]            Creatinine Trend:  SCr 0.63 [08-01 @ 06:29]  SCr 0.62 [07-31 @ 20:00]  SCr 0.64 [07-31 @ 05:18]  SCr 0.62 [07-30 @ 20:18]  SCr 0.68 [07-30 @ 12:09]    Urinalysis - [07-30-21 @ 04:26]      Color Light Yellow / Appearance Slightly Turbid / SG 1.016 / pH 6.5      Gluc Negative / Ketone Negative  / Bili Negative / Urobili Negative       Blood Negative / Protein Negative / Leuk Est Large / Nitrite Negative      RBC 5 / WBC  / Hyaline  / Gran  / Sq Epi  / Non Sq Epi  / Bacteria Few    Urine Creatinine 81      [07-30-21 @ 16:36]  Urine Sodium 69      [07-30-21 @ 16:36]  Urine Potassium 70      [07-30-21 @ 16:36]  Urine Chloride 74      [07-30-21 @ 16:36]  Urine Osmolality 544      [07-30-21 @ 16:35]        
[facility] Cardiology Progress Note  _______________________________    Pt. seen and examined. No new cardiac-related complaints.    Telemetry -sinus    T(C): 36.4 (08-02-21 @ 07:41), Max: 37.3 (08-01-21 @ 15:15)  HR: 64 (08-02-21 @ 07:41) (60 - 74)  BP: 164/95 (08-02-21 @ 07:41) (136/67 - 172/77)  RR: 18 (08-02-21 @ 07:41) (18 - 18)  SpO2: 97% (08-02-21 @ 07:41) (97% - 98%)  I&O's Summary      PHYSICAL EXAM:  GENERAL: Alert, NAD.  NECK: Supple  CHEST/LUNG: Clear to auscultation bilaterally; No wheezes, rales, or rhonchi.  HEART: S1 S2 normal, RRR; No murmurs, rubs, or gallops  ABDOMEN: Soft, Nondistended  EXTREMITIES:  No LE edema.      LABS:                        11.1   8.98  )-----------( 244      ( 01 Aug 2021 06:31 )             34.3     08-02    135  |  97  |  16  ----------------------------<  81  2.9<LL>   |  22  |  0.59    Ca    9.0      02 Aug 2021 07:02                    MEDICATIONS  (STANDING):  ALBUTerol  90 MICROgram(s) HFA Inhaler - Peds 1 Puff(s) Inhalation every 6 hours  ATENolol  Tablet 25 milliGRAM(s) Oral daily  atorvastatin 40 milliGRAM(s) Oral once  fluticasone propionate 50 MICROgram(s)/spray Nasal Spray 1 Spray(s) Both Nostrils <User Schedule>  insulin lispro (ADMELOG) corrective regimen sliding scale   SubCutaneous three times a day before meals  insulin lispro (ADMELOG) corrective regimen sliding scale   SubCutaneous at bedtime  levETIRAcetam 500 milliGRAM(s) Oral two times a day  pantoprazole    Tablet 40 milliGRAM(s) Oral before breakfast  potassium chloride    Tablet ER 20 milliEquivalent(s) Oral every 2 hours  senna 2 Tablet(s) Oral at bedtime  sodium chloride 2 Gram(s) Oral every 8 hours    MEDICATIONS  (PRN):  acetaminophen   Tablet .. 650 milliGRAM(s) Oral every 6 hours PRN Temp greater or equal to 38C (100.4F), Mild Pain (1 - 3)  ALPRAZolam 0.25 milliGRAM(s) Oral three times a day PRN anxiety  bisacodyl 5 milliGRAM(s) Oral daily PRN Constipation  famotidine    Tablet 20 milliGRAM(s) Oral two times a day PRN gerd  meclizine 12.5 milliGRAM(s) Oral Once PRN Dizziness  montelukast 10 milliGRAM(s) Oral daily PRN allergies  ondansetron Injectable 4 milliGRAM(s) IV Push every 6 hours PRN Nausea and/or Vomiting        RADIOLOGY & ADDITIONAL TESTS:    
SUBJECTIVE: Patient seen and examined at bedside. Patient admitted to [address] [place] [address] from ED overnight. She denies headache, nausea/vomiting, chest pain, shortness of breath, nausea/vomiting.  at bedside and patient agreed to be examined in front of him.  states he was calling the patient and she wasn't answering so he went home to see what was going on. He states that he noticed blood in the garage with her glasses and went inside to find his wife "comatose" on the floor and not responsive so he called 911. He also states that he helped her to the bathroom and she vomited dark blood. The patient does not remember the fall or any events surrounding the fall.    Vital Signs Last 24 Hrs  T(C): 36.3 (31 Jul 2021 15:35), Max: 36.9 (31 Jul 2021 04:55)  T(F): 97.4 (31 Jul 2021 15:35), Max: 98.4 (31 Jul 2021 04:55)  HR: 60 (31 Jul 2021 15:35) (58 - 72)  BP: 146/72 (31 Jul 2021 15:35) (130/74 - 155/74)  BP(mean): --  RR: 18 (31 Jul 2021 15:35) (18 - 18)  SpO2: 97% (31 Jul 2021 15:35) (97% - 98%)    PHYSICAL EXAM:    Constitutional: No Acute Distress, resting comfortably in bed     Neurological: Awake, alert, oriented to person, place and month/year, confused at times and immediate recall impaired, speech clear and fluent, face equal, tongue midline, briskly following commands, no drift, moves all extremities with 5/5 strength, sensation intact to light touch throughout, pupils 4mm and reactive bilaterally, extraocular movements intact, no nystagmus    Pulmonary: Clear to Auscultation, No rales, No rhonchi, No wheezes     Cardiovascular: S1, S2, Regular rate and rhythm     Gastrointestinal: Soft, Non-tender, Non-distended, +bowel sounds x 4    Extremities: No calf tenderness bilaterally, no cyanosis, clubbing or edema      LABS:                              10.7   10.66 )-----------( 247      ( 31 Jul 2021 05:18 )             32.8   07-31    133<L>  |  100  |  11  ----------------------------<  106<H>  4.1   |  24  |  0.64    Ca    8.9      31 Jul 2021 05:18    TPro  6.9  /  Alb  4.2  /  TBili  0.5  /  DBili  x   /  AST  43<H>  /  ALT  16  /  AlkPhos  99  07-29    IMAGING:     < from: CT Venogram Brain w/ IV Cont (07.30.21 @ 18:51) >  IMPRESSION: Slightly increased edema associated with the right frontal hemorrhagic contusion compared with 4:52 AM. Slightly increased size of left frontal hemorrhagic contusion. No change in fourth ventricular hemorrhage. No change in small right temporal acute subdural hematoma. Decreased hemorrhage in the posterior fossa. No change in occipital and suboccipital skull fractures.    Normal CT venogram. No venous thrombosis.    < end of copied text >      CT Head No Cont (07.29.21 @ 23:35)  IMPRESSION:  Head CT: Nondisplaced left occipital fracture. Hemorrhagic parenchymal contusions medial left cerebellum and inferior bifrontal lobes with mild surrounding vasogenic edema. Thin acute subdural hemorrhages along the falx and right frontal temporal convexities. Scattered subarachnoid hemorrhage and tiny amount of fourth ventricle intraventricular hemorrhage. No midline shift.    A CTV of the brain can be considered given that the occipital fracture abuts the torcula and medial left transverse sinus and may predispose to dural venous sinus thrombosis.    Cervical spine CT: No acute cervical spine fracture or evidence of traumatic malalignment. Cervical spondylosis as above.    The head CT findings were discussed with Dr. Tello at 1:40 AM on 7/29/2021 with read back verification.    --- End of Report ---    DAYSI JENNINGS MD; Attending Radiologist  This document has been electronically signed. Jul 30 2021  1:53AM      CT Head No Cont (07.30.21 @ 04:54)    IMPRESSION:  No interval change in the hemorrhagic parenchymal contusions, subdural and subarachnoid hemorrhages compared to CT head from 7/29/2021.    --- End of Report ---    GUSTAVO KNUTSON MD; Resident Radiology  This document has been electronically signed.  DAYSI JENNINGS MD; Attending Radiologist  This document has been electronically signed. Jul 30 2021  5:53AM      MEDICATIONS  (STANDING):  ALBUTerol  90 MICROgram(s) HFA Inhaler - Peds 1 Puff(s) Inhalation every 6 hours  ATENolol  Tablet 25 milliGRAM(s) Oral daily  atorvastatin 40 milliGRAM(s) Oral once  fluticasone propionate 50 MICROgram(s)/spray Nasal Spray 1 Spray(s) Both Nostrils <User Schedule>  insulin lispro (ADMELOG) corrective regimen sliding scale   SubCutaneous three times a day before meals  insulin lispro (ADMELOG) corrective regimen sliding scale   SubCutaneous at bedtime  levETIRAcetam 500 milliGRAM(s) Oral two times a day  pantoprazole    Tablet 40 milliGRAM(s) Oral before breakfast  senna 2 Tablet(s) Oral at bedtime  sodium chloride 2 Gram(s) Oral every 8 hours    MEDICATIONS  (PRN):  acetaminophen   Tablet .. 650 milliGRAM(s) Oral every 6 hours PRN Temp greater or equal to 38C (100.4F), Mild Pain (1 - 3)  ALPRAZolam 0.25 milliGRAM(s) Oral three times a day PRN anxiety  bisacodyl 5 milliGRAM(s) Oral daily PRN Constipation  famotidine    Tablet 20 milliGRAM(s) Oral two times a day PRN gerd  meclizine 12.5 milliGRAM(s) Oral Once PRN Dizziness  montelukast 10 milliGRAM(s) Oral daily PRN allergies  ondansetron Injectable 4 milliGRAM(s) IV Push every 6 hours PRN Nausea and/or Vomiting    DIET: CCD    
SUBJECTIVE: Pt seen and examined, pt sitting up in chair at bedside,  at bedside.  reports his wife is no longer confused like she was 2 days ago.     OVERNIGHT EVENTS: none    Vital Signs Last 24 Hrs  T(C): 37.2 (01 Aug 2021 07:35), Max: 37.8 (31 Jul 2021 19:58)  T(F): 98.9 (01 Aug 2021 07:35), Max: 100.1 (31 Jul 2021 19:58)  HR: 65 (01 Aug 2021 07:35) (60 - 81)  BP: 147/77 (01 Aug 2021 07:35) (128/77 - 167/76)  BP(mean): --  RR: 18 (01 Aug 2021 07:35) (18 - 18)  SpO2: 98% (01 Aug 2021 07:35) (95% - 98%)    PHYSICAL EXAM:    General: No Acute Distress     Neurological: Awake, alert oriented to person, place and time, Following Commands, right periorbital ecchymosis with reddened sclera, rt surgical eye, EOMI, Face Symmetrical, Speech Fluent, Moving all extremities, Muscle Strength normal in all four extremities, No Drift, Sensation to Light Touch Intact    Pulmonary: Clear to Auscultation, No Rales, No Rhonchi, No Wheezes     Cardiovascular: S1, S2, Regular Rate and Rhythm     Gastrointestinal: Soft, Nontender, Nondistended     Incision: none    LABS:                        11.1   8.98  )-----------( 244      ( 01 Aug 2021 06:31 )             34.3    08-01    137  |  100  |  14  ----------------------------<  98  3.5   |  25  |  0.63    Ca    9.1      01 Aug 2021 06:29          07-31 @ 07:01  -  08-01 @ 07:00  --------------------------------------------------------  IN: 830 mL / OUT: 910 mL / NET: -80 mL        MEDICATIONS:  Antibiotics:    Neuro:  acetaminophen   Tablet .. 650 milliGRAM(s) Oral every 6 hours PRN Temp greater or equal to 38C (100.4F), Mild Pain (1 - 3)  ALPRAZolam 0.25 milliGRAM(s) Oral three times a day PRN anxiety  levETIRAcetam 500 milliGRAM(s) Oral two times a day  meclizine 12.5 milliGRAM(s) Oral Once PRN Dizziness  ondansetron Injectable 4 milliGRAM(s) IV Push every 6 hours PRN Nausea and/or Vomiting    Cardiac:  ATENolol  Tablet 25 milliGRAM(s) Oral daily    Pulm:  ALBUTerol  90 MICROgram(s) HFA Inhaler - Peds 1 Puff(s) Inhalation every 6 hours  montelukast 10 milliGRAM(s) Oral daily PRN allergies    GI/:  bisacodyl 5 milliGRAM(s) Oral daily PRN Constipation  famotidine    Tablet 20 milliGRAM(s) Oral two times a day PRN gerd  pantoprazole    Tablet 40 milliGRAM(s) Oral before breakfast  senna 2 Tablet(s) Oral at bedtime    Other:   atorvastatin 40 milliGRAM(s) Oral once  fluticasone propionate 50 MICROgram(s)/spray Nasal Spray 1 Spray(s) Both Nostrils <User Schedule>  insulin lispro (ADMELOG) corrective regimen sliding scale   SubCutaneous three times a day before meals  insulin lispro (ADMELOG) corrective regimen sliding scale   SubCutaneous at bedtime  sodium chloride 2 Gram(s) Oral every 8 hours    DIET: [x] Regular [] CCD [] Renal [] Puree [] Dysphagia [] Tube Feeds:     IMAGING:   < from: CT Head No Cont (07.31.21 @ 18:51) >  IMPRESSION:   Hemorrhagic contusion in the inferior frontal lobes, RIGHT greater than LEFT, unchanged.    Tiny focus of subarachnoid hemorrhage in the RIGHT frontal lobe is also stable.    < end of copied text >  x< from: Xray Knee 1 or 2 Views, Left (07.30.21 @ 20:35) >    IMPRESSION:  Sizable knee joint effusion.    No fractures or dislocations.    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    Generalized osteopenia otherwise no discrete lytic or blastic lesions.    < end of copied text >  < from: CT Venogram Brain w/ IV Cont (07.30.21 @ 18:51) >  IMPRESSION: Slightly increased edema associated with the right frontal hemorrhagic contusion compared with 4:52 AM. Slightly increased size of left frontal hemorrhagic contusion. No change in fourth ventricular hemorrhage. No change in small right temporal acute subdural hematoma. Decreased hemorrhage in the posterior fossa. No change in occipital and suboccipital skull fractures.    Normal CT venogram. No venous thrombosis.    < end of copied text >  
SUBJECTIVE: Patient seen and examined at bedside. Patient admitted to [address] [place] [address] from ED overnight. She denies headache, nausea/vomiting, chest pain, shortness of breath, nausea/vomiting.  at bedside and patient agreed to be examined in front of him.  states he was calling the patient and she wasn't answering so he went home to see what was going on. He states that he noticed blood in the garage with her glasses and went inside to find his wife "comatose" on the floor and not responsive so he called 911. He also states that he helped her to the bathroom and she vomited dark blood. The patient does not remember the fall or any events surrounding the fall.    Vital Signs Last 24 Hrs  T(C): 37.2 (07-30-21 @ 07:06), Max: 37.2 (07-30-21 @ 07:06)  T(F): 98.9 (07-30-21 @ 07:06), Max: 98.9 (07-30-21 @ 07:06)  HR: 71 (07-30-21 @ 07:06) (71 - 78)  BP: 128/70 (07-30-21 @ 07:06) (116/56 - 172/75)  BP(mean): 73 (07-30-21 @ 03:30) (73 - 73)  RR: 18 (07-30-21 @ 07:06) (17 - 20)  SpO2: 96% (07-30-21 @ 07:06) (95% - 98%)    PHYSICAL EXAM:    Constitutional: No Acute Distress, resting comfortably in bed     Neurological: Awake, alert, oriented to person, place and month/year, confused at times and immediate recall impaired, speech clear and fluent, face equal, tongue midline, briskly following commands, no drift, moves all extremities with 5/5 strength, sensation intact to light touch throughout, pupils 4mm and reactive bilaterally, extraocular movements intact, no nystagmus    Pulmonary: Clear to Auscultation, No rales, No rhonchi, No wheezes     Cardiovascular: S1, S2, Regular rate and rhythm     Gastrointestinal: Soft, Non-tender, Non-distended, +bowel sounds x 4    Extremities: No calf tenderness bilaterally, no cyanosis, clubbing or edema      LABS:                          12.6   15.54 )-----------( 252      ( 29 Jul 2021 23:30 )             38.1    07-30    126<L>  |  90<L>  |  14  ----------------------------<  142<H>  3.4<L>   |  22  |  0.68    Ca    9.2      30 Jul 2021 04:33    TPro  6.9  /  Alb  4.2  /  TBili  0.5  /  DBili  x   /  AST  43<H>  /  ALT  16  /  AlkPhos  99  07-29  PT/INR - ( 29 Jul 2021 23:30 )   PT: 11.5 sec;   INR: 0.96 ratio         PTT - ( 29 Jul 2021 23:30 )  PTT:27.2 sec      IMAGING:     CT Head No Cont (07.29.21 @ 23:35)  IMPRESSION:  Head CT: Nondisplaced left occipital fracture. Hemorrhagic parenchymal contusions medial left cerebellum and inferior bifrontal lobes with mild surrounding vasogenic edema. Thin acute subdural hemorrhages along the falx and right frontal temporal convexities. Scattered subarachnoid hemorrhage and tiny amount of fourth ventricle intraventricular hemorrhage. No midline shift.    A CTV of the brain can be considered given that the occipital fracture abuts the torcula and medial left transverse sinus and may predispose to dural venous sinus thrombosis.    Cervical spine CT: No acute cervical spine fracture or evidence of traumatic malalignment. Cervical spondylosis as above.    The head CT findings were discussed with Dr. Tello at 1:40 AM on 7/29/2021 with read back verification.    --- End of Report ---    DAYSI JENNINGS MD; Attending Radiologist  This document has been electronically signed. Jul 30 2021  1:53AM      CT Head No Cont (07.30.21 @ 04:54)    IMPRESSION:  No interval change in the hemorrhagic parenchymal contusions, subdural and subarachnoid hemorrhages compared to CT head from 7/29/2021.    --- End of Report ---    GUSTAVO KNUTSON MD; Resident Radiology  This document has been electronically signed.  DAYSI JENNINGS MD; Attending Radiologist  This document has been electronically signed. Jul 30 2021  5:53AM      MEDICATIONS:  Antibiotics:    Neuro:  acetaminophen   Tablet .. 650 milliGRAM(s) Oral every 6 hours PRN Temp greater or equal to 38C (100.4F), Mild Pain (1 - 3)  ALPRAZolam 0.25 milliGRAM(s) Oral three times a day PRN anxiety  levETIRAcetam 500 milliGRAM(s) Oral two times a day  meclizine 12.5 milliGRAM(s) Oral Once PRN Dizziness    Cardiac:  ATENolol  Tablet 25 milliGRAM(s) Oral daily    Pulm:  ALBUTerol  90 MICROgram(s) HFA Inhaler - Peds 1 Puff(s) Inhalation every 6 hours  montelukast 10 milliGRAM(s) Oral daily PRN allergies    GI/:  bisacodyl 5 milliGRAM(s) Oral daily PRN Constipation  famotidine    Tablet 20 milliGRAM(s) Oral two times a day PRN gerd  pantoprazole    Tablet 40 milliGRAM(s) Oral before breakfast  senna 2 Tablet(s) Oral at bedtime PRN Constipation    Other:   atorvastatin 40 milliGRAM(s) Oral once  fluticasone propionate 50 MICROgram(s)/spray Nasal Spray 1 Spray(s) Both Nostrils <User Schedule>  sodium chloride 2% . 1000 milliLiter(s) IV Continuous <Continuous>        DIET: CCD  
SUBJECTIVE: Pt seen and examined, doing well, no complaints, wants to go home    OVERNIGHT EVENTS: none    Vital Signs Last 24 Hrs  T(C): 36.4 (02 Aug 2021 07:41), Max: 37.3 (01 Aug 2021 15:15)  T(F): 97.5 (02 Aug 2021 07:41), Max: 99.2 (01 Aug 2021 15:15)  HR: 64 (02 Aug 2021 07:41) (60 - 74)  BP: 164/95 (02 Aug 2021 07:41) (139/79 - 172/77)  BP(mean): --  RR: 18 (02 Aug 2021 07:41) (18 - 18)  SpO2: 97% (02 Aug 2021 07:41) (97% - 98%)    PHYSICAL EXAM:    General: No Acute Distress     Neurological: Awake, alert oriented to person, place and time, Following Commands, left periorbital ecchymosis with reddened sclera, lt  surgical pupil, EOMI, Face Symmetrical, Speech Fluent,  Muscle Strength normal in all four extremities, No Drift, Sensation to Light Touch Intact    Pulmonary: Clear to Auscultation, No Rales, No Rhonchi, No Wheezes     Cardiovascular: S1, S2, Regular Rate and Rhythm     Gastrointestinal: Soft, Nontender, Nondistended     Incision: none    LABS:                        11.1   8.98  )-----------( 244      ( 01 Aug 2021 06:31 )             34.3    08-02    135  |  97  |  16  ----------------------------<  81  2.9<LL>   |  22  |  0.59    Ca    9.0      02 Aug 2021 07:02          08-02 @ 07:01  -  08-02 @ 10:09  --------------------------------------------------------  IN: 120 mL / OUT: 0 mL / NET: 120 mL      DRAINS: none    MEDICATIONS:  Antibiotics:    Neuro:  acetaminophen   Tablet .. 650 milliGRAM(s) Oral every 6 hours PRN Temp greater or equal to 38C (100.4F), Mild Pain (1 - 3)  ALPRAZolam 0.25 milliGRAM(s) Oral three times a day PRN anxiety  levETIRAcetam 500 milliGRAM(s) Oral two times a day  meclizine 12.5 milliGRAM(s) Oral Once PRN Dizziness  ondansetron Injectable 4 milliGRAM(s) IV Push every 6 hours PRN Nausea and/or Vomiting    Cardiac:  ATENolol  Tablet 25 milliGRAM(s) Oral daily    Pulm:  ALBUTerol  90 MICROgram(s) HFA Inhaler - Peds 1 Puff(s) Inhalation every 6 hours  montelukast 10 milliGRAM(s) Oral daily PRN allergies    GI/:  bisacodyl 5 milliGRAM(s) Oral daily PRN Constipation  famotidine    Tablet 20 milliGRAM(s) Oral two times a day PRN gerd  pantoprazole    Tablet 40 milliGRAM(s) Oral before breakfast  senna 2 Tablet(s) Oral at bedtime    Other:   atorvastatin 40 milliGRAM(s) Oral once  fluticasone propionate 50 MICROgram(s)/spray Nasal Spray 1 Spray(s) Both Nostrils <User Schedule>  insulin lispro (ADMELOG) corrective regimen sliding scale   SubCutaneous three times a day before meals  insulin lispro (ADMELOG) corrective regimen sliding scale   SubCutaneous at bedtime  potassium chloride    Tablet ER 20 milliEquivalent(s) Oral every 2 hours  sodium chloride 2 Gram(s) Oral every 8 hours    DIET: [x] Regular [] CCD [] Renal [] Puree [] Dysphagia [] Tube Feeds:     IMAGING:   < from: CT Head No Cont (07.31.21 @ 18:51) >  IMPRESSION:   Hemorrhagic contusion in the inferior frontal lobes, RIGHT greater than LEFT, unchanged.    Tiny focus of subarachnoid hemorrhage in the RIGHT frontal lobe is also stable.    < end of copied text >  x< from: Xray Knee 1 or 2 Views, Left (07.30.21 @ 20:35) >    IMPRESSION:  Sizable knee joint effusion.    No fractures or dislocations.    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    Generalized osteopenia otherwise no discrete lytic or blastic lesions.    < end of copied text >  < from: CT Venogram Brain w/ IV Cont (07.30.21 @ 18:51) >  IMPRESSION: Slightly increased edema associated with the right frontal hemorrhagic contusion compared with 4:52 AM. Slightly increased size of left frontal hemorrhagic contusion. No change in fourth ventricular hemorrhage. No change in small right temporal acute subdural hematoma. Decreased hemorrhage in the posterior fossa. No change in occipital and suboccipital skull fractures.    Normal CT venogram. No venous thrombosis.    < end of copied text >      
Subjective: Patient seen and examined. No new events except as noted.     REVIEW OF SYSTEMS:    CONSTITUTIONAL: No weakness, fevers or chills  EYES/ENT: No visual changes;  No vertigo or throat pain   NECK: No pain or stiffness  RESPIRATORY: No cough, wheezing, hemoptysis; No shortness of breath  CARDIOVASCULAR: No chest pain or palpitations  GASTROINTESTINAL: No abdominal or epigastric pain. No nausea, vomiting, or hematemesis; No diarrhea or constipation. No melena or hematochezia.  GENITOURINARY: No dysuria, frequency or hematuria  NEUROLOGICAL: No numbness or weakness  SKIN: No itching, burning, rashes, or lesions   All other review of systems is negative unless indicated above.    MEDICATIONS:  MEDICATIONS  (STANDING):  ALBUTerol  90 MICROgram(s) HFA Inhaler - Peds 1 Puff(s) Inhalation every 6 hours  ATENolol  Tablet 25 milliGRAM(s) Oral daily  atorvastatin 40 milliGRAM(s) Oral once  fluticasone propionate 50 MICROgram(s)/spray Nasal Spray 1 Spray(s) Both Nostrils <User Schedule>  insulin lispro (ADMELOG) corrective regimen sliding scale   SubCutaneous three times a day before meals  insulin lispro (ADMELOG) corrective regimen sliding scale   SubCutaneous at bedtime  levETIRAcetam 500 milliGRAM(s) Oral two times a day  pantoprazole    Tablet 40 milliGRAM(s) Oral before breakfast  senna 2 Tablet(s) Oral at bedtime  sodium chloride 2 Gram(s) Oral every 8 hours      PHYSICAL EXAM:  T(C): 37.2 (08-01-21 @ 07:35), Max: 37.8 (07-31-21 @ 19:58)  HR: 65 (08-01-21 @ 07:35) (60 - 81)  BP: 147/77 (08-01-21 @ 07:35) (128/77 - 167/76)  RR: 18 (08-01-21 @ 07:35) (18 - 18)  SpO2: 98% (08-01-21 @ 07:35) (95% - 98%)  Wt(kg): --  I&O's Summary    31 Jul 2021 07:01  -  01 Aug 2021 07:00  --------------------------------------------------------  IN: 830 mL / OUT: 910 mL / NET: -80 mL          Appearance: NAD L minda-orbital ecchymosis   HEENT:   Normal oral mucosa, PERRL, EOMI	  Lymphatic: No lymphadenopathy  Cardiovascular: Normal S1 S2, No JVD, No murmurs, No edema  Respiratory: Lungs clear to auscultation	  Psychiatry: A & O x 2 confused  Gastrointestinal:  Soft, Non-tender, + BS	  Skin: No rashes, No ecchymoses, No cyanosis	  Neurologic: Non-focal  Extremities: Normal range of motion, Right elbow and Right knee bruising   Vascular: Peripheral pulses palpable 2+ bilaterally    LABS:    CARDIAC MARKERS:                                11.1   8.98  )-----------( 244      ( 01 Aug 2021 06:31 )             34.3     08-01    137  |  100  |  14  ----------------------------<  98  3.5   |  25  |  0.63    Ca    9.1      01 Aug 2021 06:29      proBNP:   Lipid Profile:   HgA1c:   TSH:           TELEMETRY: SR 	    ECG:  	  RADIOLOGY:   DIAGNOSTIC TESTING:  [ ] Echocardiogram:  [ ]  Catheterization:  [ ] Stress Test:    OTHER: 	          
Subjective: Patient seen and examined. No new events except as noted.     REVIEW OF SYSTEMS:    CONSTITUTIONAL: No weakness, fevers or chills  EYES/ENT: No visual changes;  No vertigo or throat pain   NECK: No pain or stiffness  RESPIRATORY: No cough, wheezing, hemoptysis; No shortness of breath  CARDIOVASCULAR: No chest pain or palpitations  GASTROINTESTINAL: No abdominal or epigastric pain. No nausea, vomiting, or hematemesis; No diarrhea or constipation. No melena or hematochezia.  GENITOURINARY: No dysuria, frequency or hematuria  NEUROLOGICAL: No numbness or weakness  SKIN: No itching, burning, rashes, or lesions   All other review of systems is negative unless indicated above.    MEDICATIONS:  MEDICATIONS  (STANDING):  ALBUTerol  90 MICROgram(s) HFA Inhaler - Peds 1 Puff(s) Inhalation every 6 hours  ATENolol  Tablet 25 milliGRAM(s) Oral daily  atorvastatin 40 milliGRAM(s) Oral once  fluticasone propionate 50 MICROgram(s)/spray Nasal Spray 1 Spray(s) Both Nostrils <User Schedule>  insulin lispro (ADMELOG) corrective regimen sliding scale   SubCutaneous three times a day before meals  insulin lispro (ADMELOG) corrective regimen sliding scale   SubCutaneous at bedtime  levETIRAcetam 500 milliGRAM(s) Oral two times a day  pantoprazole    Tablet 40 milliGRAM(s) Oral before breakfast  senna 2 Tablet(s) Oral at bedtime  sodium chloride 2 Gram(s) Oral every 8 hours      PHYSICAL EXAM:  T(C): 37.8 (07-31-21 @ 19:58), Max: 37.8 (07-31-21 @ 19:58)  HR: 73 (07-31-21 @ 19:58) (60 - 73)  BP: 161/80 (07-31-21 @ 19:58) (130/74 - 161/80)  RR: 18 (07-31-21 @ 19:58) (18 - 18)  SpO2: 95% (07-31-21 @ 19:58) (95% - 98%)  Wt(kg): --  I&O's Summary    30 Jul 2021 07:01  -  31 Jul 2021 07:00  --------------------------------------------------------  IN: 450 mL / OUT: 1650 mL / NET: -1200 mL    31 Jul 2021 07:01  -  31 Jul 2021 22:26  --------------------------------------------------------  IN: 630 mL / OUT: 0 mL / NET: 630 mL          Appearance: Normal	  HEENT:   Normal oral mucosa, PERRL, EOMI	  Lymphatic: No lymphadenopathy , no edema  Cardiovascular: Normal S1 S2, No JVD, No murmurs , Peripheral pulses palpable 2+ bilaterally  Respiratory: Lungs clear to auscultation, normal effort 	  Gastrointestinal:  Soft, Non-tender, + BS	  Skin: No rashes, No ecchymoses, No cyanosis, warm to touch  Musculoskeletal: Normal range of motion, normal strength  Psychiatry:  Mood & affect appropriate  Ext: No edema      LABS:    CARDIAC MARKERS:                                10.7   10.66 )-----------( 247      ( 31 Jul 2021 05:18 )             32.8     07-31    134<L>  |  102  |  14  ----------------------------<  102<H>  3.8   |  22  |  0.62    Ca    9.0      31 Jul 2021 20:00    TPro  6.9  /  Alb  4.2  /  TBili  0.5  /  DBili  x   /  AST  43<H>  /  ALT  16  /  AlkPhos  99  07-29    proBNP:   Lipid Profile:   HgA1c:   TSH:     Negative          TELEMETRY: 	  SR   ECG:  	NSR non specific stt changes   RADIOLOGY: < from: CT Head No Cont (07.31.21 @ 18:51) >    EXAM:  CT BRAIN                            PROCEDURE DATE:  07/31/2021            INTERPRETATION:  CT head without IV contrast    CLINICAL INFORMATION:  Fall,   Intracranial hemorrhage.    TECHNIQUE: Contiguous axial 5 mm sections were obtained through the head. Sagittal and coronal 2-D reformatted images were also obtained.   This scan was performed using automatic exposure control (radiation dose reduction software) to obtain a diagnostic image quality scan with patient dose as low as reasonably achievable.    FINDINGS:   No previous examinations are available for review.    The brain demonstrates hemorrhagic contusion in the inferior frontal lobes, RIGHT greater than LEFT, unchanged. Tiny focus of subarachnoid hemorrhage in the RIGHT frontal lobe is also stable. No acute cerebral cortical infarct is seen.  No intracranial hemorrhage is found.  No mass effect is found in the brain.    The ventricles, sulci and basal cisterns appear unremarkable.    The orbits are unremarkable.  The paranasal sinuses are clear.  The nasal cavity appears intact.  The nasopharynx is symmetric.  The central skull base, petrous temporal bones and calvarium remain intact.      IMPRESSION:   Hemorrhagic contusion in the inferior frontal lobes, RIGHT greater than LEFT, unchanged.    Tiny focus of subarachnoid hemorrhage in the RIGHT frontal lobe is also stable.    --- End of Report ---                ASHANTI HODGSON MD; Attending Radiologist  This document has been electronically signed. Jul 31 2021  7:45PM    < end of copied text >    DIAGNOSTIC TESTING:  [ ] Echocardiogram: < from: TTE with Doppler (w/3D Echo) (Transthoracic Echocardiogram) (07.30.21 @ 16:49) >    Patient name: SAUNDRA GAVIN  YOB: 1945   Age: 76 (F)   MR#: [id number]  Study Date: 7/30/2021  Location: [address] [place], [place] [id number]: Verenice Albarran RDCS  Study quality: Technically good  Referring Physician: Erica Kaminski MD  Blood Pressure: 122/60 mmHg  Height: 160 cm  Weight: 61 kg  BSA: 1.6 m2  Heart Rate: 77 mmHg  ------------------------------------------------------------------------  PROCEDURE: Transthoracic echocardiogram with 2-D, M-Mode  and complete spectral and color flow Doppler. Real-time and  reconstructed 3-dimensional imaging was performed.  Color  Doppler analysis was carried out.  INDICATION: Syncope and collapse (R55)  ------------------------------------------------------------------------  Dimensions:    Normal Values:  LA:     3.4    2.0 - 4.0 cm  Ao:     2.9    2.0 - 3.8 cm  SEPTUM: 0.8    0.6 - 1.2 cm  PWT:    0.8    0.6 - 1.1 cm  LVIDd:  4.3    3.0 - 5.6 cm  LVIDs:  3.0    1.8 - 4.0 cm  Derived variables:  LVMI: 66 g/m2  RWT: 0.36  Fractional short: 31 %  EF (Visual Estimate): 75-80 %  EF (3D Quantification): 77 %Doppler Peak Velocity (m/sec):  AoV=1.4  ------------------------------------------------------------------------  Observations:  Mitral Valve: Mitral annular calcification, otherwise  normal mitral valve. Minimal mitral regurgitation.  Aortic Valve/Aorta: Normal trileaflet aortic valve. Peak  transaortic valve gradient equals 8 mm Hg, estimated aortic  valve area equals 3.3 sqcm. Mild aortic regurgitation.  Peak left ventricular outflow tract gradient equals 5 mm  Hg.  Aortic Root: 2.9 cm.  LVOT diameter: 2.3 cm.  Left Atrium: Mildly dilated left atrium.  LA volume index =  39 cc/m2.  Left Ventricle: Hyperdynamic left ventricular systolic  function. Normal left ventricular internal dimensions and  wall thicknesses. Mild diastolic dysfunction (Stage I).  Right Heart: Normal right atrium. Normal right ventricular  size and function. Normal tricuspid valve. Mild tricuspid  regurgitation. Pulmonic valve not well visualized, probably  normal. Mild pulmonic regurgitation.  Pericardium/Pleura: Normal pericardium with no pericardial  effusion.  Hemodynamic: Estimated right atrial pressure is 8 mm Hg.  Estimated right ventricular systolic pressure equals 39 mm  Hg, assuming right atrial pressure equals 8 mm Hg,  consistent with borderline pulmonary hypertension.  ------------------------------------------------------------------------  Conclusions:  1. Normal trileaflet aortic valve. Mild aortic  regurgitation.  2. Normal left ventricular internal dimensions and wall  thicknesses.  3. Hyperdynamic left ventricular systolic function.  4. Normal right ventricular size and function.  5. Estimated right ventricular systolic pressure equals 39  mm Hg, assuming right atrial pressure equals 8 mm Hg,  consistent with borderline pulmonary hypertension.  *** No previous Echo exam.  ------------------------------------------------------------------------  Confirmed on  7/30/2021 - 20:52:09 by Ciro Robin M.D.  ------------------------------------------------------------------------    < end of copied text >    [ ]  Catheterization:  [ ] Stress Test:    OTHER:

## 2021-08-02 NOTE — DISCHARGE NOTE PROVIDER - NSDCMRMEDTOKEN_GEN_ALL_CORE_FT
atenolol 25 mg oral tablet: 1 tab(s) orally once a day  Dexilant 60 mg oral delayed release capsule: 1 cap(s) orally once a day  Dyazide 25 mg-37.5 mg oral capsule: 1 cap(s) orally once a day  Flonase 50 mcg/inh nasal spray: 1 spray(s) nasal once a day  Lipitor 40 mg oral tablet: 1 tab(s) orally once a day  meclizine 12.5 mg oral tablet: 1 tab(s) orally 3 times a day, As Needed  Pepcid 20 mg oral tablet: 1 tab(s) orally 2 times a day, As Needed  Rolling Walker: Use as needed for ambulation  DX: Traumatic SAH, contusions  Singulair 10 mg oral tablet: 1 tab(s) orally once a day  Ventolin HFA 90 mcg/inh inhalation aerosol: 2 puff(s) inhaled every 6 hours  Xanax 0.25 mg oral tablet: 1 tab(s) orally 3 times a day, As Needed   acetaminophen 325 mg oral tablet: 2 tab(s) orally every 6 hours, As needed, Mild Pain (1 - 3)  atenolol 25 mg oral tablet: 1 tab(s) orally once a day  Dexilant 60 mg oral delayed release capsule: 1 cap(s) orally once a day  Flonase 50 mcg/inh nasal spray: 1 spray(s) nasal once a day  levETIRAcetam 500 mg oral tablet: 1 tab(s) orally 2 times a day  Lipitor 40 mg oral tablet: 1 tab(s) orally once a day  meclizine 12.5 mg oral tablet: 1 tab(s) orally 3 times a day, As Needed  Pepcid 20 mg oral tablet: 1 tab(s) orally 2 times a day, As Needed  Singulair 10 mg oral tablet: 1 tab(s) orally once a day  sodium chloride 1 g oral tablet: 1 tab(s) orally 2 times a day   Ventolin HFA 90 mcg/inh inhalation aerosol: 2 puff(s) inhaled every 6 hours  Xanax 0.25 mg oral tablet: 1 tab(s) orally 3 times a day, As Needed

## 2021-08-02 NOTE — PROGRESS NOTE ADULT - ASSESSMENT
76F s/p multiple falls.  multiple syncope with fall followed by N/V w/ bloody emesis. Cannot remember events, cannot remember if there was LOC. Exam: AAOx3, no drift, EOMI, no facial, WALTERS 5/5. No open scalp lacs. CTH shows R frontal contusion, thin L frontal tSAH, thin R frontal SDH, small amount 4th ventricular IVH and small amount L cerebellar traumatic SAH. L occipital skull fx with ejected bome fragment into region of the torcula, small pneumocephalus.

## 2021-08-02 NOTE — PROGRESS NOTE ADULT - REASON FOR ADMISSION
Admitted 7/30 s/p fall with traumatic subdural hematoma, thin left frontal traumatic SAH, LT occipital skull fracture and RT frontal contusion

## 2021-08-02 NOTE — PROGRESS NOTE ADULT - ATTENDING COMMENTS
Upon discharge, for appointment scheduling please email Nephrology at [email or web address]. Please have patient follow up with Nephrology office at  with one of our physicians in 2-3 weeks Our address is [address] [place] [address]    juarez bernal  nephrology attending   Cell# [phone]   Office- [phone]

## 2021-08-02 NOTE — PROGRESS NOTE ADULT - PROBLEM SELECTOR PLAN 1
unclear etiology   Monitor on tele   Check orthostatics when able   Fall precautions.
-unclear etiology although patient denies passing out.   -orthostatic vitals unremarkable  -tte unremarkable  -no significant events on tele  -she should be evaluated for loop recorder, will discuss as outpatient.
unclear etiology   Monitor on tele   Check orthostatics when able   Fall precautions.

## 2021-08-02 NOTE — DISCHARGE NOTE PROVIDER - CARE PROVIDER_API CALL
Erica Kaminski)  [facility] Neurosurgery  General  [address], [address]  [place] [address]  Phone: [phone]  Fax: [phone]  Follow Up Time: 1 week

## 2021-08-02 NOTE — DISCHARGE NOTE PROVIDER - NSDCFUADDAPPT_GEN_ALL_CORE_FT
Please follow up with your Orthopedist in 1-2 weeks for knee effusion seen on xray  Please follow up with your Opthalmologist in 1-2 weeks for follow up of left eye.

## 2021-08-02 NOTE — PROGRESS NOTE ADULT - ASSESSMENT
Hyponatremia in setting of likely inapp ADH due to CNS lesion- based on urine lytes SIADH  In addition, at home was drinking a lot of water  sodium  levels have no improved  advised patient to decrease PO water intake  Cont FWR 1L   patient planned for DC today     Hypokalemia:   potassium noted to be 2.9 this morning   please replete     If any questions, please feel free to contact me     Mars Call  Nephrology Fellow  [facility] Pager: [phone]

## 2021-08-02 NOTE — PROGRESS NOTE ADULT - ASSESSMENT
77yo F h.o HTN/HLD, DM II, Hiatal hernia Venous insufficiency, Asthma, OA b/l knees presents to ED after multiple syncope today with fall. Pt currently AAOx3 however complaining of confusion and headache, cannot remember clearly the events surrounding falls. Accompanied by  who reports patient was found on the ground earlier today when he arrived home, he helped her to the rest room where she subsequently had several episodes of vomiting dark blood. CT head was done and revealed a nondisplaced left occipital fracture, hemorrhagic parenchymal contusions in the medial left cerebellum and inferior bifrontal lobes with mild surrounding vasogenic edema, thin acute subdural hemorrhages along the falx and right frontal temporal convexities, scattered traumatic subarachnoid hemorrhage and tiny amount of fourth ventricle intraventricular hemorrhage. No midline shift. Repeat CT head shows unchanged bilateral contusions, tiny SAH is stable.        PLAN:  Neuro:   - repeat Ct head shows unchanged bilateral contusions, tiny SAH is stable  - neuro exam is stable  - hyponatremia- Na improving now 135  - continue keppra for seizure prophylaxis  Respiratory:   - hx of asthma- albuterol MDI q6, fluticasone nasal spray  CV:  - HTN- continue atenolol  - Cardiology following for  Syncope: unclear etiology   - Monitor on tele    - Check orthostatics when able   - Fall precautions.   - on statin   DVT ppx:   - venodynes  Renal:   - hyponatremia -Nephrology recs appreciated  - Na 135 on salt tabs 2g q8  GI:    - poor appetite, better today  PT/OT:   - Home PT w/RW  Of note patient states she follows up with her orthopedist monthly. She does not want orthopedics consulted in the hospital for knee effusion seen on xray      Inda[facility] # [id number]     77yo F h.o HTN/HLD, DM II, Hiatal hernia Venous insufficiency, Asthma, OA b/l knees presents to ED after multiple syncope today with fall. Pt currently AAOx3 however complaining of confusion and headache, cannot remember clearly the events surrounding falls. Accompanied by  who reports patient was found on the ground earlier today when he arrived home, he helped her to the rest room where she subsequently had several episodes of vomiting dark blood. CT head was done and revealed a nondisplaced left occipital fracture, hemorrhagic parenchymal contusions in the medial left cerebellum and inferior bifrontal lobes with mild surrounding vasogenic edema, thin acute subdural hemorrhages along the falx and right frontal temporal convexities, scattered traumatic subarachnoid hemorrhage and tiny amount of fourth ventricle intraventricular hemorrhage. No midline shift. Repeat CT head shows unchanged bilateral contusions, tiny SAH is stable.        PLAN:  Neuro:   - repeat Ct head shows unchanged bilateral contusions, tiny SAH is stable  - neuro exam is stable  - hyponatremia- Na improving now 135  - continue keppra for seizure prophylaxis  Respiratory:   - hx of asthma- albuterol MDI q6, fluticasone nasal spray  CV:  - HTN- continue atenolol  - hypokalemia- supplemented  - Cardiology following for  Syncope: unclear etiology   - Monitor on tele - no events noted   - orthostatics are wnl  - Fall precautions.   - TTE- borderline mild pulmonary HTN otherwise unremarkable  - on statin   DVT ppx:   - venodynes  - LE dop- neg for DVT  Renal:   - hyponatremia -Nephrology recs appreciated  - Na 135 on salt tabs 2g q8  GI:    - poor appetite, better today  PT/OT:   - Home PT w/RW  Of note patient states she follows up with her orthopedist monthly. She does not want orthopedics consulted in the hospital for knee effusion seen on xray      [email or web address] # [id number]     75yo F h.o HTN/HLD, DM II, Hiatal hernia Venous insufficiency, Asthma, OA b/l knees presents to ED after multiple syncope today with fall. Pt currently AAOx3 however complaining of confusion and headache, cannot remember clearly the events surrounding falls. Accompanied by  who reports patient was found on the ground earlier today when he arrived home, he helped her to the rest room where she subsequently had several episodes of vomiting dark blood. CT head was done and revealed a nondisplaced left occipital fracture, hemorrhagic parenchymal contusions in the medial left cerebellum and inferior bifrontal lobes with mild surrounding vasogenic edema, thin acute subdural hemorrhages along the falx and right frontal temporal convexities, scattered traumatic subarachnoid hemorrhage and tiny amount of fourth ventricle intraventricular hemorrhage. No midline shift. Repeat CT head shows unchanged bilateral contusions, tiny SAH is stable.        PLAN:  Neuro:   - repeat Ct head shows unchanged bilateral contusions, tiny SAH is stable  - neuro exam is stable  - hyponatremia- Na improving now 135  - continue keppra for seizure prophylaxis  Respiratory:   - hx of asthma- albuterol MDI q6, fluticasone nasal spray  CV:  - HTN- continue atenolol  - hypokalemia- supplemented  - Cardiology following for  Syncope: unclear etiology   - Monitor on tele - no events noted   - orthostatics are wnl  - Fall precautions.   - TTE- borderline mild pulmonary HTN otherwise unremarkable  - on statin   DVT ppx:   - venodynes  - LE dop- neg for DVT  Renal:   - hyponatremia -Nephrology recs appreciated  - Na 135 on salt tabs 2g q8  GI:    - poor appetite, better today  PT/OT:   - Home PT w/RW  Of note patient states she follows up with her orthopedist monthly. She does not want orthopedics consulted in the hospital for knee effusion seen on xray. Pt states she has appointments already set for her PCP tomorrow      [facility] # [id number]

## 2021-08-02 NOTE — DISCHARGE NOTE PROVIDER - NSDCCPCAREPLAN_GEN_ALL_CORE_FT
PRINCIPAL DISCHARGE DIAGNOSIS  Diagnosis: Focal hemorrhagic contusion of cerebrum  Assessment and Plan of Treatment: CT head revealed a nondisplaced left occipital fracture, hemorrhagic parenchymal contusions in the medial left cerebellum and inferior bifrontal lobes with mild surrounding vasogenic edema, thin acute subdural hemorrhages, scattered traumatic subarachnoid hemorrhage and tiny amount of fourth ventricle intraventricular hemorrhage.  Folow up with Dr Kaminski in 1 week      SECONDARY DISCHARGE DIAGNOSES  Diagnosis: Hyponatremia  Assessment and Plan of Treatment: Hyponatremia improving. Continue to add salt to your diet until follow up with Dr Kaminski     PRINCIPAL DISCHARGE DIAGNOSIS  Diagnosis: Focal hemorrhagic contusion of cerebrum  Assessment and Plan of Treatment: CT head revealed a nondisplaced left occipital fracture, hemorrhagic parenchymal contusions in the medial left cerebellum and inferior bifrontal lobes with mild surrounding vasogenic edema, thin acute subdural hemorrhages, scattered traumatic subarachnoid hemorrhage and tiny amount of fourth ventricle intraventricular hemorrhage.  Folow up with Dr Kaminski in 1 week      SECONDARY DISCHARGE DIAGNOSES  Diagnosis: Hyponatremia  Assessment and Plan of Treatment: Hyponatremia improving. Continue to add salt to your diet until follow up with Dr Kaminski. Continue to restrict your fluid intake to 1 liter daily. Follow up with yor Primary Care Physician to recheck your Sodium level in 5-7 days.    Diagnosis: Syncope, unspecified syncope type  Assessment and Plan of Treatment: Echocardiogram is unremarkable, mild borderline pulmonary hypertension. Follow up with your Primary Care Physician or Cardiologist  in 1 week. No events captured on telemetry.     PRINCIPAL DISCHARGE DIAGNOSIS  Diagnosis: Focal hemorrhagic contusion of cerebrum  Assessment and Plan of Treatment: CT head revealed a nondisplaced left occipital fracture, hemorrhagic parenchymal contusions in the medial left cerebellum and inferior bifrontal lobes with mild surrounding vasogenic edema, thin acute subdural hemorrhages, scattered traumatic subarachnoid hemorrhage and tiny amount of fourth ventricle intraventricular hemorrhage.  Folow up with Dr Kaminski in 1 week      SECONDARY DISCHARGE DIAGNOSES  Diagnosis: Hyponatremia  Assessment and Plan of Treatment: Hyponatremia improving. Continue to add salt to your diet until follow up with Dr Kaminski. Continue to restrict your fluid intake to 1 liter daily. Follow up with yor Primary Care Physician to recheck your Sodium level in 5-7 days.    Diagnosis: Syncope, unspecified syncope type  Assessment and Plan of Treatment: Echocardiogram is unremarkable, mild borderline pulmonary hypertension. Follow up with your Primary Care Physician or Cardiologist  in 1 week. No events captured on telemetry.    Diagnosis: HTN (hypertension)  Assessment and Plan of Treatment: Continue atenolol, hold dyazide until you have your sodium blood level repeated. Follow up with your Primary Care Physician in 1 week.

## 2021-08-02 NOTE — PROGRESS NOTE ADULT - PROVIDER SPECIALTY LIST ADULT
Nephrology
Neurosurgery
Neurosurgery
Nephrology
Internal Medicine
Neurosurgery
Neurosurgery
Cardiology
Nephrology
Cardiology
Cardiology

## 2021-08-02 NOTE — PROGRESS NOTE ADULT - PROBLEM SELECTOR PLAN 2
-  -supplement mag and potassium levels to keep above 2 and 4 respectively.    Patient of Dr. Steven Goldberg ([facility])    Jimmy Moya D.O.  [phone]
sx follow up   neuro checks   repeat CT head.
sx follow up   neuro checks   repeat CT head.

## 2021-08-02 NOTE — DISCHARGE NOTE PROVIDER - HOSPITAL COURSE
77yo F h.o HTN/HLD, DM II, Hiatal hernia Venous insufficiency, Asthma, OA b/l knees presents to ED after multiple syncope today with fall. Pt currently AAOx3 however complaining of confusion and headache, cannot remember clearly the events surrounding falls. Accompanied by  who reports patient was found on the ground earlier today when he arrived home, he helped her to the rest room where she subsequently had several episodes of vomiting dark blood. CT head was done and revealed a nondisplaced left occipital fracture, hemorrhagic parenchymal contusions in the medial left cerebellum and inferior bifrontal lobes with mild surrounding vasogenic edema, thin acute subdural hemorrhages along the falx and right frontal temporal convexities, scattered traumatic subarachnoid hemorrhage and tiny amount of fourth ventricle intraventricular hemorrhage. No midline shift. Serial head CTs remain stable. unchanged bilateral contusions, tiny SAH is stable. Neuro exam is stable. Evaluated by Physical Therapy and Home PT was recommended. On day of discharge patient is medically and neurologically stable.      77yo F h.o HTN/HLD, DM II, Hiatal hernia Venous insufficiency, Asthma, OA b/l knees presents to ED after multiple syncope today with fall. Pt currently AAOx3 however complaining of confusion and headache, cannot remember clearly the events surrounding falls. Accompanied by  who reports patient was found on the ground earlier today when he arrived home, he helped her to the rest room where she subsequently had several episodes of vomiting dark blood. CT head was done and revealed a nondisplaced left occipital fracture, hemorrhagic parenchymal contusions in the medial left cerebellum and inferior bifrontal lobes with mild surrounding vasogenic edema, thin acute subdural hemorrhages along the falx and right frontal temporal convexities, scattered traumatic subarachnoid hemorrhage and tiny amount of fourth ventricle intraventricular hemorrhage. No midline shift. Serial head CTs remain stable. unchanged bilateral contusions, tiny SAH is stable. Echo is unremarkable except for mild borderline HTN. Neuro exam is stable. Evaluated by Physical Therapy and Home PT was recommended. On day of discharge patient is medically and neurologically stable.

## 2021-08-02 NOTE — CONSULT NOTE ADULT - SUBJECTIVE AND OBJECTIVE BOX
Admitting Diagnosis:  Non-traumatic intracranial hemorrhage [I62.9]  NONTRAUMATIC INTRACRANIAL HEMORRHAGE, UNSPECIFIED        HPI:  This is a 76y year old Female with the below past medical history who presents with the chief complaint of falls. PMHx of HTN/HLD, DM II, Hiatal hernia Venous insufficiency, Asthma, OA b/l knees presents to ED after multiple syncope on 7/30.   Pt found by  in bed, she had fallen in house, she was confused, vomiting blood, called EMS.  Pt found to have occipital fx and ICH, admitted to NS service.  Pt has had improvement in level of mentation over the few days since admission as per .  She is well known to my associate Dr. Reid for gait issues, has peripheral neuropathy, she has done gait and balance PT in our office for some time.  She denies new focal weakness, numbness, changes in speech, swallow, vision, bowel or bladder control.  While in house, issues with hyponatremia.      Past Medical History:      Past Surgical History:      Social History:  No toxic habits    Family History:  FAMILY HISTORY:      Allergies:  No Known Allergies      ROS:  Constitutional: Patient offers no complaints of fevers or significant weight loss  Ears, Nose, Mouth and Throat: The patient presents with no abnormalities of the head, ears, eyes, nose or throat  Skin: Patient offers no concerns of new rashes or lesions  Respiratory: The patient presents with no abnormalities of the respiratory tract  Cardiovascular: The patient presents with no cardiac abnormalities  Gastrointestinal: The patient presents with no abnormalities of the GI system  Genitourinary: The patient presents with no dysuria, hematuria or frequent urination  Neurological: See HPI  Endocrine: Patient offers no complaints of excessive thirst, urination, or heat/cold intolerance    Advanced care planning reviewed and noted in the chart.    Medications:  acetaminophen   Tablet .. 650 milliGRAM(s) Oral every 6 hours PRN  ALBUTerol  90 MICROgram(s) HFA Inhaler - Peds 1 Puff(s) Inhalation every 6 hours  ALPRAZolam 0.25 milliGRAM(s) Oral three times a day PRN  ATENolol  Tablet 25 milliGRAM(s) Oral daily  atorvastatin 40 milliGRAM(s) Oral once  bisacodyl 5 milliGRAM(s) Oral daily PRN  famotidine    Tablet 20 milliGRAM(s) Oral two times a day PRN  fluticasone propionate 50 MICROgram(s)/spray Nasal Spray 1 Spray(s) Both Nostrils <User Schedule>  insulin lispro (ADMELOG) corrective regimen sliding scale   SubCutaneous three times a day before meals  insulin lispro (ADMELOG) corrective regimen sliding scale   SubCutaneous at bedtime  levETIRAcetam 500 milliGRAM(s) Oral two times a day  meclizine 12.5 milliGRAM(s) Oral Once PRN  montelukast 10 milliGRAM(s) Oral daily PRN  ondansetron Injectable 4 milliGRAM(s) IV Push every 6 hours PRN  pantoprazole    Tablet 40 milliGRAM(s) Oral before breakfast  potassium chloride    Tablet ER 20 milliEquivalent(s) Oral every 2 hours  senna 2 Tablet(s) Oral at bedtime  sodium chloride 2 Gram(s) Oral every 8 hours      Labs:  CBC Full  -  ( 01 Aug 2021 06:31 )  WBC Count : 8.98 K/uL  RBC Count : 3.81 M/uL  Hemoglobin : 11.1 g/dL  Hematocrit : 34.3 %  Platelet Count - Automated : 244 K/uL  Mean Cell Volume : 90.0 fl  Mean Cell Hemoglobin : 29.1 pg  Mean Cell Hemoglobin Concentration : 32.4 gm/dL  Auto Neutrophil # : 6.96 K/uL  Auto Lymphocyte # : 1.17 K/uL  Auto Monocyte # : 0.73 K/uL  Auto Eosinophil # : 0.03 K/uL  Auto Basophil # : 0.03 K/uL  Auto Neutrophil % : 77.6 %  Auto Lymphocyte % : 13.0 %  Auto Monocyte % : 8.1 %  Auto Eosinophil % : 0.3 %  Auto Basophil % : 0.3 %    08-02    135  |  97  |  16  ----------------------------<  81  2.9<LL>   |  22  |  0.59    Ca    9.0      02 Aug 2021 07:02      CAPILLARY BLOOD GLUCOSE      POCT Blood Glucose.: 84 mg/dL (02 Aug 2021 07:40)  POCT Blood Glucose.: 91 mg/dL (01 Aug 2021 21:17)  POCT Blood Glucose.: 88 mg/dL (01 Aug 2021 16:22)  POCT Blood Glucose.: 96 mg/dL (01 Aug 2021 10:59)          Female    Vitals:  Vital Signs Last 24 Hrs  T(C): 36.4 (02 Aug 2021 07:41), Max: 37.3 (01 Aug 2021 15:15)  T(F): 97.5 (02 Aug 2021 07:41), Max: 99.2 (01 Aug 2021 15:15)  HR: 64 (02 Aug 2021 07:41) (60 - 74)  BP: 164/95 (02 Aug 2021 07:41) (139/79 - 172/77)  BP(mean): --  RR: 18 (02 Aug 2021 07:41) (18 - 18)  SpO2: 97% (02 Aug 2021 07:41) (97% - 98%)    NEUROLOGICAL EXAM:    Mental status: Awake, alert, and in no apparent distress. Oriented to person, place and time. Language function is normal. Recent memory, digit span and concentration were normal.     Cranial Nerves: Pupils were equal, round, reactive to light. Extraocular movements were intact. Visual field were full. Fundoscopic exam was deferred. Facial sensation was intact to light touch. There was no facial asymmetry. The palate was upgoing symmetrically and tongue was midline. Hearing acuity was intact to finger rub AU. Shoulder shrug was full bilaterally  left eye ecchymosis    Motor exam: Bulk and tone were normal. Strength was 5/5 in all four extremities. Fine finger movements were symmetric and normal. There was no pronator drift knee ecchymosis    Reflexes: 2+ in the bilateral upper extremities. 2+ in the bilateral lower extremities. Toes were downgoing bilaterally.     Sensation: Intact to light touch, temperature, vibration and proprioception.     Coordination: Finger-nose-finger and heel-to-shin was without dysmetria.     Gait: Narrow base and steady. gait unassisted but not completely steady    < from: CT Head No Cont (07.31.21 @ 18:51) >    EXAM:  CT BRAIN                            PROCEDURE DATE:  07/31/2021            INTERPRETATION:  CT head without IV contrast    CLINICAL INFORMATION:  Fall,   Intracranial hemorrhage.    TECHNIQUE: Contiguous axial 5 mm sections were obtained through the head. Sagittal and coronal 2-D reformatted images were also obtained.   This scan was performed using automatic exposure control (radiation dose reduction software) to obtain a diagnostic image quality scan with patient dose as low as reasonably achievable.    FINDINGS:   No previous examinations are available for review.    The brain demonstrates hemorrhagic contusion in the inferior frontal lobes, RIGHT greater than LEFT, unchanged. Tiny focus of subarachnoid hemorrhage in the RIGHT frontal lobe is also stable. No acute cerebral cortical infarct is seen.  No intracranial hemorrhage is found.  No mass effect is found in the brain.    The ventricles, sulci and basal cisterns appear unremarkable.    The orbits are unremarkable.  The paranasal sinuses are clear.  The nasal cavity appears intact.  The nasopharynx is symmetric.  The central skull base, petrous temporal bones and calvarium remain intact.      IMPRESSION:   Hemorrhagic contusion in the inferior frontal lobes, RIGHT greater than LEFT, unchanged.    Tiny focus of subarachnoid hemorrhage in the RIGHT frontal lobe is also stable.    --- End of Report ---                ASHANTI HODGSON MD; Attending Radiologist  This document has been electronically signed. Jul 31 2021  7:45PM    < end of copied text >

## 2021-08-02 NOTE — DISCHARGE NOTE PROVIDER - NSDCFUADDINST_GEN_ALL_CORE_FT
Return to ER for fever, chills, nausea or vomiting, severe headache or pain not relieved with pain medication, sluggishness or change in mental status, or any weakness of extremities.   You may shower.    No driving until cleared by your surgeon. Do not return to work until cleared by surgeon. Do not take advil, aleve or ibuprofen as they can cause bleeding.

## 2022-05-06 NOTE — ED ADULT NURSE NOTE - NSIMPLEMENTINTERV_GEN_ALL_ED
Implemented All Fall with Harm Risk Interventions:  [place] to call system. Call bell, personal items and telephone within reach. Instruct patient to call for assistance. Room bathroom lighting operational. Non-slip footwear when patient is off stretcher. Physically safe environment: no spills, clutter or unnecessary equipment. Stretcher in lowest position, wheels locked, appropriate side rails in place. Provide visual cue, wrist band, yellow gown, etc. Monitor gait and stability. Monitor for mental status changes and reorient to person, place, and time. Review medications for side effects contributing to fall risk. Reinforce activity limits and safety measures with patient and family. Provide visual clues: red socks.
Render Risk Assessment In Note?: no
Detail Level: Simple
Additional Notes: Pt given CBC/CMP lab slip today and to get them drawn at earliest convenience.
